# Patient Record
Sex: MALE | Race: WHITE | Employment: UNEMPLOYED | URBAN - METROPOLITAN AREA
[De-identification: names, ages, dates, MRNs, and addresses within clinical notes are randomized per-mention and may not be internally consistent; named-entity substitution may affect disease eponyms.]

---

## 2020-07-21 ENCOUNTER — ED HISTORICAL/CONVERTED ENCOUNTER (OUTPATIENT)
Dept: OTHER | Age: 60
End: 2020-07-21

## 2021-01-10 ENCOUNTER — HOSPITAL ENCOUNTER (EMERGENCY)
Age: 61
Discharge: HOME | End: 2021-01-10
Attending: EMERGENCY MEDICINE | Admitting: EMERGENCY MEDICINE
Payer: MEDICAID

## 2021-01-10 VITALS
SYSTOLIC BLOOD PRESSURE: 127 MMHG | BODY MASS INDEX: 31.07 KG/M2 | DIASTOLIC BLOOD PRESSURE: 88 MMHG | TEMPERATURE: 98.2 F | HEIGHT: 68 IN | RESPIRATION RATE: 17 BRPM | WEIGHT: 205 LBS | HEART RATE: 89 BPM | OXYGEN SATURATION: 95 %

## 2021-01-10 DIAGNOSIS — T33.90XA SUPERFICIAL FROSTBITE, INITIAL ENCOUNTER: Primary | ICD-10-CM

## 2021-01-10 PROCEDURE — 99284 EMERGENCY DEPT VISIT MOD MDM: CPT

## 2021-01-10 NOTE — ED NOTES
Attempting to call pts brenda Mayen Logbryan at [phone], however voicemail is full and not  Able to accept messages, will attempt to find another ride for pt

## 2021-01-10 NOTE — DISCHARGE INSTRUCTIONS
Take tylenol 650mg every 6 hours for pain. Stay in warm environment. Call 911 if you are exposed to cold again.

## 2021-01-10 NOTE — ED TRIAGE NOTES
Per EMS, pts car broke down at 0130am and pt sat in his car since then, pt complains of cold hands and feet    Pt lives with his daughter however didn't want to wake her up

## 2021-01-10 NOTE — ED PROVIDER NOTES
HPI   Chief Complaint   Patient presents with    Physical   57yoM hx spinal cord injury presents for cold exposure. Pt reports his truck broke down at 0130am and pt sat in his truck since then, pt complains of cold painful right hand and feet. Pt lives with his daughter however didn't want to wake her up to pick him up. Past Medical History:   Diagnosis Date    Neurologic abnormality     spinal cord injury, can still walk       History reviewed. No pertinent surgical history. History reviewed. No pertinent family history. Social History     Socioeconomic History    Marital status: SINGLE     Spouse name: Not on file    Number of children: Not on file    Years of education: Not on file    Highest education level: Not on file   Occupational History    Not on file   Social Needs    Financial resource strain: Not on file    Food insecurity     Worry: Not on file     Inability: Not on file    Transportation needs     Medical: Not on file     Non-medical: Not on file   Tobacco Use    Smoking status: Not on file   Substance and Sexual Activity    Alcohol use: Not on file    Drug use: Never    Sexual activity: Not on file   Lifestyle    Physical activity     Days per week: Not on file     Minutes per session: Not on file    Stress: Not on file   Relationships    Social connections     Talks on phone: Not on file     Gets together: Not on file     Attends Mu-ism service: Not on file     Active member of club or organization: Not on file     Attends meetings of clubs or organizations: Not on file     Relationship status: Not on file    Intimate partner violence     Fear of current or ex partner: Not on file     Emotionally abused: Not on file     Physically abused: Not on file     Forced sexual activity: Not on file   Other Topics Concern    Not on file   Social History Narrative    Not on file         ALLERGIES: Patient has no known allergies.     Review of Systems   Constitutional: Cold   Musculoskeletal:        Cold painful right hand and both feet   All other systems reviewed and are negative. Vitals:    01/10/21 0710   BP: 129/82   Pulse: 91   Resp: 16   Temp: 97.9 °F (36.6 °C)   SpO2: 96%   Weight: 93 kg (205 lb)   Height: 5' 8\" (1.727 m)            Physical Exam   Patient Vitals for the past 8 hrs:   Temp Pulse Resp BP SpO2   01/10/21 0710 97.9 °F (36.6 °C) 91 16 129/82 96 %        Nursing note and vitals reviewed. Constitutional: NAD. Head: Normocephalic and atraumatic. Mouth/Throat: Airway patent. Moist mucous membranes. Eyes: EOMI. No Scleral icterus. Neck: Neck supple. Cardiovascular: Well perfused throughout. Pulmonary/Chest: No respiratory distress. Musculoskeletal: No gross deformities. Mild cyanosis to all right fingertips and all toe tips b/l, SILT to all these areas, no blistering; cap refill to all these areas 3sec. Neurological: Alert and oriented to person, place, and time. Cranial Nerves 2-12 intact. Moving all extremities. No gross deficits  Psych: Pleasant, cooperative. Skin: Skin is warm and dry. No rash noted. MDM   Suspect superficial frostbite. I discussed staying warm with the patient as the main treatment. He is given warm blankets and warm space in ED. Called daughter to pick him up to home. Given instructions to f/u PCP.         Procedures

## 2021-06-15 ENCOUNTER — HOSPITAL ENCOUNTER (INPATIENT)
Age: 61
LOS: 2 days | Discharge: HOME | DRG: 190 | End: 2021-06-17
Attending: EMERGENCY MEDICINE | Admitting: FAMILY MEDICINE
Payer: MEDICARE

## 2021-06-15 ENCOUNTER — APPOINTMENT (OUTPATIENT)
Dept: CT IMAGING | Age: 61
DRG: 190 | End: 2021-06-15
Attending: EMERGENCY MEDICINE
Payer: MEDICARE

## 2021-06-15 DIAGNOSIS — R09.02 HYPOXIA: Primary | ICD-10-CM

## 2021-06-15 DIAGNOSIS — N20.0 RENAL CALCULUS OR STONE: ICD-10-CM

## 2021-06-15 DIAGNOSIS — I71.43 ANEURYSM OF INFRARENAL ABDOMINAL AORTA: ICD-10-CM

## 2021-06-15 PROBLEM — J44.9 COPD (CHRONIC OBSTRUCTIVE PULMONARY DISEASE) (HCC): Status: ACTIVE | Noted: 2021-06-15

## 2021-06-15 LAB
ALBUMIN SERPL-MCNC: 3.7 G/DL (ref 3.5–5)
ALBUMIN/GLOB SERPL: 0.8 {RATIO} (ref 1.1–2.2)
ALP SERPL-CCNC: 100 U/L (ref 45–117)
ALT SERPL-CCNC: 58 U/L (ref 12–78)
ANION GAP SERPL CALC-SCNC: 8 MMOL/L (ref 5–15)
APPEARANCE UR: CLEAR
AST SERPL W P-5'-P-CCNC: 28 U/L (ref 15–37)
BACTERIA URNS QL MICRO: NEGATIVE /HPF
BASOPHILS # BLD: 0 K/UL (ref 0–0.1)
BASOPHILS NFR BLD: 0 % (ref 0–1)
BILIRUB SERPL-MCNC: 0.4 MG/DL (ref 0.2–1)
BILIRUB UR QL: NEGATIVE
BUN SERPL-MCNC: 10 MG/DL (ref 6–20)
BUN/CREAT SERPL: 12 (ref 12–20)
CA-I BLD-MCNC: 8.9 MG/DL (ref 8.5–10.1)
CHLORIDE SERPL-SCNC: 102 MMOL/L (ref 97–108)
CO2 SERPL-SCNC: 25 MMOL/L (ref 21–32)
COLOR UR: ABNORMAL
CREAT SERPL-MCNC: 0.82 MG/DL (ref 0.7–1.3)
DIFFERENTIAL METHOD BLD: ABNORMAL
EOSINOPHIL # BLD: 0.6 K/UL (ref 0–0.4)
EOSINOPHIL NFR BLD: 4 % (ref 0–7)
ERYTHROCYTE [DISTWIDTH] IN BLOOD BY AUTOMATED COUNT: 14.9 % (ref 11.5–14.5)
GLOBULIN SER CALC-MCNC: 4.8 G/DL (ref 2–4)
GLUCOSE BLD STRIP.AUTO-MCNC: 122 MG/DL (ref 65–117)
GLUCOSE SERPL-MCNC: 131 MG/DL (ref 65–100)
GLUCOSE UR STRIP.AUTO-MCNC: NEGATIVE MG/DL
HCT VFR BLD AUTO: 46.9 % (ref 36.6–50.3)
HGB BLD-MCNC: 15.6 G/DL (ref 12.1–17)
HGB UR QL STRIP: NEGATIVE
IMM GRANULOCYTES # BLD AUTO: 0 K/UL
IMM GRANULOCYTES NFR BLD AUTO: 0 %
KETONES UR QL STRIP.AUTO: NEGATIVE MG/DL
LEUKOCYTE ESTERASE UR QL STRIP.AUTO: NEGATIVE
LIPASE SERPL-CCNC: 41 U/L (ref 73–393)
LYMPHOCYTES # BLD: 4.5 K/UL (ref 0.8–3.5)
LYMPHOCYTES NFR BLD: 32 % (ref 12–49)
MCH RBC QN AUTO: 30.9 PG (ref 26–34)
MCHC RBC AUTO-ENTMCNC: 33.3 G/DL (ref 30–36.5)
MCV RBC AUTO: 92.9 FL (ref 80–99)
MONOCYTES # BLD: 2 K/UL (ref 0–1)
MONOCYTES NFR BLD: 14 % (ref 5–13)
NEUTS SEG # BLD: 6.6 K/UL (ref 1.8–8)
NEUTS SEG NFR BLD: 47 % (ref 32–75)
NITRITE UR QL STRIP.AUTO: NEGATIVE
NRBC # BLD: 0 K/UL (ref 0–0.01)
NRBC BLD-RTO: 0 PER 100 WBC
OTHER CELLS NFR BLD MANUAL: 3 %
PERFORMED BY, TECHID: ABNORMAL
PH UR STRIP: 5 [PH] (ref 5–8)
PLATELET # BLD AUTO: 373 K/UL (ref 150–400)
PMV BLD AUTO: 11.1 FL (ref 8.9–12.9)
POTASSIUM SERPL-SCNC: 3.9 MMOL/L (ref 3.5–5.1)
PROT SERPL-MCNC: 8.5 G/DL (ref 6.4–8.2)
PROT UR STRIP-MCNC: NEGATIVE MG/DL
RBC # BLD AUTO: 5.05 M/UL (ref 4.1–5.7)
RBC #/AREA URNS HPF: ABNORMAL /HPF (ref 0–5)
RBC MORPH BLD: ABNORMAL
SODIUM SERPL-SCNC: 135 MMOL/L (ref 136–145)
SP GR UR REFRACTOMETRY: >1.03 (ref 1–1.03)
TROPONIN I SERPL-MCNC: <0.05 NG/ML
UA: UC IF INDICATED,UAUC: ABNORMAL
UROBILINOGEN UR QL STRIP.AUTO: 0.1 EU/DL (ref 0.1–1)
WBC # BLD AUTO: 14 K/UL (ref 4.1–11.1)
WBC URNS QL MICRO: ABNORMAL /HPF (ref 0–4)

## 2021-06-15 PROCEDURE — 85025 COMPLETE CBC W/AUTO DIFF WBC: CPT

## 2021-06-15 PROCEDURE — 74011250636 HC RX REV CODE- 250/636: Performed by: FAMILY MEDICINE

## 2021-06-15 PROCEDURE — 74011000258 HC RX REV CODE- 258: Performed by: FAMILY MEDICINE

## 2021-06-15 PROCEDURE — 74011250637 HC RX REV CODE- 250/637: Performed by: FAMILY MEDICINE

## 2021-06-15 PROCEDURE — 87086 URINE CULTURE/COLONY COUNT: CPT

## 2021-06-15 PROCEDURE — 81001 URINALYSIS AUTO W/SCOPE: CPT

## 2021-06-15 PROCEDURE — 65270000029 HC RM PRIVATE

## 2021-06-15 PROCEDURE — 74011000636 HC RX REV CODE- 636: Performed by: EMERGENCY MEDICINE

## 2021-06-15 PROCEDURE — 99285 EMERGENCY DEPT VISIT HI MDM: CPT

## 2021-06-15 PROCEDURE — 96374 THER/PROPH/DIAG INJ IV PUSH: CPT

## 2021-06-15 PROCEDURE — 84484 ASSAY OF TROPONIN QUANT: CPT

## 2021-06-15 PROCEDURE — 94640 AIRWAY INHALATION TREATMENT: CPT

## 2021-06-15 PROCEDURE — 94761 N-INVAS EAR/PLS OXIMETRY MLT: CPT

## 2021-06-15 PROCEDURE — 74011250636 HC RX REV CODE- 250/636: Performed by: EMERGENCY MEDICINE

## 2021-06-15 PROCEDURE — 80053 COMPREHEN METABOLIC PANEL: CPT

## 2021-06-15 PROCEDURE — 74011000250 HC RX REV CODE- 250: Performed by: FAMILY MEDICINE

## 2021-06-15 PROCEDURE — 82962 GLUCOSE BLOOD TEST: CPT

## 2021-06-15 PROCEDURE — 93005 ELECTROCARDIOGRAM TRACING: CPT

## 2021-06-15 PROCEDURE — 83036 HEMOGLOBIN GLYCOSYLATED A1C: CPT

## 2021-06-15 PROCEDURE — 83690 ASSAY OF LIPASE: CPT

## 2021-06-15 PROCEDURE — 36415 COLL VENOUS BLD VENIPUNCTURE: CPT

## 2021-06-15 PROCEDURE — 71275 CT ANGIOGRAPHY CHEST: CPT

## 2021-06-15 PROCEDURE — 74177 CT ABD & PELVIS W/CONTRAST: CPT

## 2021-06-15 RX ORDER — MORPHINE SULFATE 4 MG/ML
4 INJECTION INTRAVENOUS ONCE
Status: COMPLETED | OUTPATIENT
Start: 2021-06-15 | End: 2021-06-15

## 2021-06-15 RX ORDER — BUDESONIDE AND FORMOTEROL FUMARATE DIHYDRATE 160; 4.5 UG/1; UG/1
2 AEROSOL RESPIRATORY (INHALATION) 2 TIMES DAILY
Status: DISCONTINUED | OUTPATIENT
Start: 2021-06-15 | End: 2021-06-17 | Stop reason: HOSPADM

## 2021-06-15 RX ORDER — ACETAMINOPHEN 325 MG/1
650 TABLET ORAL
Status: DISCONTINUED | OUTPATIENT
Start: 2021-06-15 | End: 2021-06-17 | Stop reason: HOSPADM

## 2021-06-15 RX ORDER — DEXTROSE 50 % IN WATER (D50W) INTRAVENOUS SYRINGE
25-50 AS NEEDED
Status: DISCONTINUED | OUTPATIENT
Start: 2021-06-15 | End: 2021-06-17 | Stop reason: HOSPADM

## 2021-06-15 RX ORDER — ACETAMINOPHEN 650 MG/1
650 SUPPOSITORY RECTAL
Status: DISCONTINUED | OUTPATIENT
Start: 2021-06-15 | End: 2021-06-17 | Stop reason: HOSPADM

## 2021-06-15 RX ORDER — ENOXAPARIN SODIUM 100 MG/ML
40 INJECTION SUBCUTANEOUS DAILY
Status: DISCONTINUED | OUTPATIENT
Start: 2021-06-16 | End: 2021-06-17 | Stop reason: HOSPADM

## 2021-06-15 RX ORDER — IPRATROPIUM BROMIDE AND ALBUTEROL SULFATE 2.5; .5 MG/3ML; MG/3ML
3 SOLUTION RESPIRATORY (INHALATION)
Status: DISCONTINUED | OUTPATIENT
Start: 2021-06-15 | End: 2021-06-17 | Stop reason: HOSPADM

## 2021-06-15 RX ORDER — ONDANSETRON 4 MG/1
4 TABLET, ORALLY DISINTEGRATING ORAL
Status: DISCONTINUED | OUTPATIENT
Start: 2021-06-15 | End: 2021-06-17 | Stop reason: HOSPADM

## 2021-06-15 RX ORDER — INSULIN LISPRO 100 [IU]/ML
INJECTION, SOLUTION INTRAVENOUS; SUBCUTANEOUS
Status: DISCONTINUED | OUTPATIENT
Start: 2021-06-15 | End: 2021-06-17 | Stop reason: HOSPADM

## 2021-06-15 RX ORDER — ONDANSETRON 2 MG/ML
4 INJECTION INTRAMUSCULAR; INTRAVENOUS
Status: DISCONTINUED | OUTPATIENT
Start: 2021-06-15 | End: 2021-06-17 | Stop reason: HOSPADM

## 2021-06-15 RX ORDER — POLYETHYLENE GLYCOL 3350 17 G/17G
17 POWDER, FOR SOLUTION ORAL DAILY PRN
Status: DISCONTINUED | OUTPATIENT
Start: 2021-06-15 | End: 2021-06-17 | Stop reason: HOSPADM

## 2021-06-15 RX ORDER — MAGNESIUM SULFATE 100 %
4 CRYSTALS MISCELLANEOUS AS NEEDED
Status: DISCONTINUED | OUTPATIENT
Start: 2021-06-15 | End: 2021-06-17 | Stop reason: HOSPADM

## 2021-06-15 RX ADMIN — PIPERACILLIN AND TAZOBACTAM 3.38 G: 3; .375 INJECTION, POWDER, LYOPHILIZED, FOR SOLUTION INTRAVENOUS at 21:12

## 2021-06-15 RX ADMIN — METHYLPREDNISOLONE SODIUM SUCCINATE 40 MG: 40 INJECTION, POWDER, FOR SOLUTION INTRAMUSCULAR; INTRAVENOUS at 21:11

## 2021-06-15 RX ADMIN — IOPAMIDOL 100 ML: 755 INJECTION, SOLUTION INTRAVENOUS at 17:57

## 2021-06-15 RX ADMIN — MORPHINE SULFATE 4 MG: 4 INJECTION INTRAVENOUS at 16:55

## 2021-06-15 RX ADMIN — BUDESONIDE AND FORMOTEROL FUMARATE DIHYDRATE 2 PUFF: 160; 4.5 AEROSOL RESPIRATORY (INHALATION) at 20:39

## 2021-06-15 RX ADMIN — ACETAMINOPHEN 650 MG: 325 TABLET ORAL at 21:12

## 2021-06-15 RX ADMIN — IPRATROPIUM BROMIDE AND ALBUTEROL SULFATE 3 ML: .5; 3 SOLUTION RESPIRATORY (INHALATION) at 20:39

## 2021-06-15 NOTE — ED TRIAGE NOTES
Pt reports flank pain that started about one year ago, worsened over the month and today the pain was unbearable. Denies hematuria and dysuria. Denies any sob, cp.  Pt on 4L at 96% no o2 at home

## 2021-06-15 NOTE — ED PROVIDER NOTES
HPI   Chief Complaint   Patient presents with    Flank Pain     60yoM hx neurologic abnormality (chronic L1 spinal cord injury, incomplete) presents with flank pain. Pt reports 1 year of constant worsening right flank pain that starts at RUQ and wraps around his back, sharp. Became severe this past week, worse with walking. No fevers, dysuria, n/v/d/constipation, food does not affect the pain. Denies IVDU. Pt says he chronically has groin numbness and mild urinary retention (cannot feel full bladder). Denies incontinence. He was supposed to get worked up years ago but was \"afraid\" of the urologic procedure and did not show. Pt denies SOB. Says has chronic smokers cough but not worse than normal.     Past Medical History:   Diagnosis Date    Neurologic abnormality     spinal cord injury, can still walk       History reviewed. No pertinent surgical history. History reviewed. No pertinent family history. Social History     Socioeconomic History    Marital status: SINGLE     Spouse name: Not on file    Number of children: Not on file    Years of education: Not on file    Highest education level: Not on file   Occupational History    Not on file   Tobacco Use    Smoking status: Current Every Day Smoker   Substance and Sexual Activity    Alcohol use: Not on file    Drug use: Never    Sexual activity: Not on file   Other Topics Concern    Not on file   Social History Narrative    Not on file     Social Determinants of Health     Financial Resource Strain:     Difficulty of Paying Living Expenses:    Food Insecurity:     Worried About Running Out of Food in the Last Year:     [address] in the Last Year:    Transportation Needs:     Lack of Transportation (Medical):      Lack of Transportation (Non-Medical):    Physical Activity:     Days of Exercise per Week:     Minutes of Exercise per Session:    Stress:     Feeling of Stress :    Social Connections:     Frequency of Communication with Friends and Family:     Frequency of Social Gatherings with Friends and Family:     Attends Jainism Services:     Active Member of Clubs or Organizations:     Attends Club or Organization Meetings:     Marital Status:    Intimate Partner Violence:     Fear of Current or Ex-Partner:     Emotionally Abused:     Physically Abused:     Sexually Abused: ALLERGIES: Patient has no known allergies. Review of Systems   Genitourinary: Positive for difficulty urinating (chronic) and flank pain. Neurological: Positive for numbness (chronic groin numb). All other systems reviewed and are negative. Vitals:    06/15/21 1544 06/15/21 1600 06/15/21 1639   BP: (!) 155/105 (!) 148/103 (!) 160/98   Pulse: 91 91 90   Resp: 20 20 17   Temp: 97.9 °F (36.6 °C)     SpO2: (!) 80% 96% 96%   Weight: 90.7 kg (200 lb)     Height: 5' 9\" (1.753 m)              Physical Exam   Patient Vitals for the past 8 hrs:   Temp Pulse Resp BP SpO2   06/15/21 1639 -- 90 17 (!) 160/98 96 %   06/15/21 1600 -- 91 20 (!) 148/103 96 %   06/15/21 1544 97.9 °F (36.6 °C) 91 20 (!) 155/105 (!) 80 %        Nursing note and vitals reviewed. Constitutional: Mild distress from pain. Head: Normocephalic and atraumatic. Mouth/Throat: Airway patent. Moist mucous membranes. Eyes: EOMI. No scleral icterus. Neck: Neck supple. Cardiovascular: Normal rate, regular rhythm. Normal heart sounds. No murmur, rub, or gallop. Good pulses throughout. Pulmonary/Chest: No respiratory distress. Mildly diminished breath sounds throughout. Minimal wheezing. Abdominal/GI: BS normal, Soft, mild RUQ tenderness without Whyte's sign, mild right CVA tenderness to percussion, non-distended. No rebound or guarding. Musculoskeletal: No gross injuries or deformities. No C/T/L tenderness or stepoff. Neurological: Alert and oriented to person, place, and time. Cranial Nerves 2-12 intact. Moving all extremities with 5/5 strength.    Psych: Pleasant, cooperative. Skin: Skin is warm and dry. No rash noted. MDM   Ddx = PE, undiagnosed COPD, pneumonia, ACS, AAA, renal stone, pyelonephritis, gallstone disease, epidural abscess is on the differential but less likely without fever or spinal tenderness. EKG preliminary interpretation by me at 1547 showing normal sinus rhythm, rate 99, no STEMI. There are T wave inversions in anterolateral leads. No large PE on CTA chest.  On CT a/p does have AAA but very small at 2.4cm infrarenal. No obstructing renal stone. I consulted Dr. Des Cannon who is admitting to his service. ICD-10-CM ICD-9-CM    1. Hypoxia  R09.02 799.02    2. Aneurysm of infrarenal abdominal aorta (HCC)  I71.4 441.4    3. Renal calculus or stone  N20.0 592.0        Labs Reviewed   CBC WITH AUTOMATED DIFF - Abnormal; Notable for the following components:       Result Value    WBC 14.0 (*)     RDW 14.9 (*)     MONOCYTES 14 (*)     ABS. LYMPHOCYTES 4.5 (*)     ABS. MONOCYTES 2.0 (*)     ABS. EOSINOPHILS 0.6 (*)     All other components within normal limits   METABOLIC PANEL, COMPREHENSIVE - Abnormal; Notable for the following components:    Sodium 135 (*)     Glucose 131 (*)     Protein, total 8.5 (*)     Globulin 4.8 (*)     A-G Ratio 0.8 (*)     All other components within normal limits   LIPASE - Abnormal; Notable for the following components:    Lipase 41 (*)     All other components within normal limits   URINALYSIS W/ REFLEX CULTURE - Abnormal; Notable for the following components:    Specific gravity >1.030 (*)     All other components within normal limits   TROPONIN I     CTA CHEST W OR W WO CONT   Final Result   1. Study degraded by suboptimal bolus timing and respiratory motion artifact. Allowing for these limitations, no large central pulmonary edema was not. 2. Small pericardial effusion. 3. Nonspecific lymphadenopathy in the chest. Consider follow-up imaging to   evaluate for resolution.    4. There is mosaic attenuation in the lungs. There is basilar bronchial   thickening. Findings are favored to be related to small airways disease. 5. Findings of old granulomatous disease. CT ABD PELV W CONT   Final Result   1. Mild bladder wall thickening with pericystic fat stranding. Recommend   correlation with urinalysis for any evidence of cystitis. 2. Small pericardial effusion. 3. Hepatic steatosis. 4. Nonobstructing right renal stones. No ureteral or bladder stone. No   hydronephrosis. There are areas of cortical scarring in the right kidney as has   been seen on prior study. 5. Infrarenal abdominal aortic aneurysm, measuring 2.4 cm in AP dimension. 6. Normal appendix. 7. Please see above report for further details. Medications   morphine injection 4 mg (4 mg IntraVENous Given 6/15/21 1264)   iopamidoL (ISOVUE-370) 76 % injection 100 mL (100 mL IntraVENous Given 6/15/21 3945)     Sonia HIDALGO MD, am  the first and primary ED provider for this patient.           Procedures

## 2021-06-15 NOTE — Clinical Note
Status[de-identified] INPATIENT [101]   Type of Bed: Medical [8]   Cardiac Monitoring Required?: Yes   Inpatient Hospitalization Certified Necessary for the Following Reasons: 3.  Patient receiving treatment that can only be provided in an inpatient setting (further clarification in H&P documentation)   Admitting Diagnosis: Hypoxia [849297]   Admitting Physician: Alma Shields [[id number]]   Attending Physician: Alma Shields [[id number]]   Estimated Length of Stay: 3-4 Midnights   Discharge Plan[de-identified] Home with Office Follow-up

## 2021-06-16 ENCOUNTER — APPOINTMENT (OUTPATIENT)
Dept: GENERAL RADIOLOGY | Age: 61
DRG: 190 | End: 2021-06-16
Attending: INTERNAL MEDICINE
Payer: MEDICARE

## 2021-06-16 LAB
ALBUMIN SERPL-MCNC: 3.7 G/DL (ref 3.5–5)
ALBUMIN/GLOB SERPL: 0.7 {RATIO} (ref 1.1–2.2)
ALP SERPL-CCNC: 94 U/L (ref 45–117)
ALT SERPL-CCNC: 57 U/L (ref 12–78)
ANION GAP SERPL CALC-SCNC: 13 MMOL/L (ref 5–15)
ARTERIAL PATENCY WRIST A: ABNORMAL
AST SERPL W P-5'-P-CCNC: 25 U/L (ref 15–37)
ATRIAL RATE: 99 BPM
BASE EXCESS BLDA CALC-SCNC: 2.2 MMOL/L (ref 0–2)
BASOPHILS # BLD: 0 K/UL (ref 0–0.1)
BASOPHILS NFR BLD: 0 % (ref 0–1)
BDY SITE: ABNORMAL
BILIRUB SERPL-MCNC: 0.4 MG/DL (ref 0.2–1)
BUN SERPL-MCNC: 16 MG/DL (ref 6–20)
BUN/CREAT SERPL: 13 (ref 12–20)
CA-I BLD-MCNC: 9.4 MG/DL (ref 8.5–10.1)
CALCULATED P AXIS, ECG09: 57 DEGREES
CALCULATED R AXIS, ECG10: 14 DEGREES
CALCULATED T AXIS, ECG11: 50 DEGREES
CHLORIDE SERPL-SCNC: 98 MMOL/L (ref 97–108)
CO2 SERPL-SCNC: 22 MMOL/L (ref 21–32)
CREAT SERPL-MCNC: 1.23 MG/DL (ref 0.7–1.3)
DIAGNOSIS, 93000: NORMAL
DIFFERENTIAL METHOD BLD: ABNORMAL
EOSINOPHIL # BLD: 0 K/UL (ref 0–0.4)
EOSINOPHIL NFR BLD: 0 % (ref 0–7)
ERYTHROCYTE [DISTWIDTH] IN BLOOD BY AUTOMATED COUNT: 15.1 % (ref 11.5–14.5)
GAS FLOW.O2 O2 DELIVERY SYS: 2 L/MIN
GLOBULIN SER CALC-MCNC: 5.1 G/DL (ref 2–4)
GLUCOSE BLD STRIP.AUTO-MCNC: 223 MG/DL (ref 65–117)
GLUCOSE BLD STRIP.AUTO-MCNC: 225 MG/DL (ref 65–117)
GLUCOSE BLD STRIP.AUTO-MCNC: 293 MG/DL (ref 65–117)
GLUCOSE BLD STRIP.AUTO-MCNC: 350 MG/DL (ref 65–117)
GLUCOSE SERPL-MCNC: 294 MG/DL (ref 65–100)
HCO3 BLDA-SCNC: 26 MMOL/L (ref 22–26)
HCT VFR BLD AUTO: 48.7 % (ref 36.6–50.3)
HGB BLD-MCNC: 15.7 G/DL (ref 12.1–17)
IMM GRANULOCYTES # BLD AUTO: 0.1 K/UL (ref 0–0.04)
IMM GRANULOCYTES NFR BLD AUTO: 1 % (ref 0–0.5)
LYMPHOCYTES # BLD: 2.8 K/UL (ref 0.8–3.5)
LYMPHOCYTES NFR BLD: 20 % (ref 12–49)
MCH RBC QN AUTO: 30.5 PG (ref 26–34)
MCHC RBC AUTO-ENTMCNC: 32.2 G/DL (ref 30–36.5)
MCV RBC AUTO: 94.6 FL (ref 80–99)
MONOCYTES # BLD: 1.1 K/UL (ref 0–1)
MONOCYTES NFR BLD: 8 % (ref 5–13)
NEUTS SEG # BLD: 9.7 K/UL (ref 1.8–8)
NEUTS SEG NFR BLD: 71 % (ref 32–75)
NRBC # BLD: 0 K/UL (ref 0–0.01)
NRBC BLD-RTO: 0 PER 100 WBC
P-R INTERVAL, ECG05: 136 MS
PCO2 BLDA: 44 MMHG (ref 35–45)
PERFORMED BY, TECHID: ABNORMAL
PH BLDA: 7.41 [PH] (ref 7.35–7.45)
PLATELET # BLD AUTO: 379 K/UL (ref 150–400)
PMV BLD AUTO: 10.7 FL (ref 8.9–12.9)
PO2 BLDA: 60 MMHG (ref 75–100)
POTASSIUM SERPL-SCNC: 3.8 MMOL/L (ref 3.5–5.1)
PROT SERPL-MCNC: 8.8 G/DL (ref 6.4–8.2)
Q-T INTERVAL, ECG07: 370 MS
QRS DURATION, ECG06: 112 MS
QTC CALCULATION (BEZET), ECG08: 474 MS
RBC # BLD AUTO: 5.15 M/UL (ref 4.1–5.7)
SAO2 % BLD: 93 %
SAO2% DEVICE SAO2% SENSOR NAME: ABNORMAL
SODIUM SERPL-SCNC: 133 MMOL/L (ref 136–145)
SPECIMEN SITE: ABNORMAL
VENTRICULAR RATE, ECG03: 99 BPM
WBC # BLD AUTO: 13.7 K/UL (ref 4.1–11.1)

## 2021-06-16 PROCEDURE — 71045 X-RAY EXAM CHEST 1 VIEW: CPT

## 2021-06-16 PROCEDURE — 74011250637 HC RX REV CODE- 250/637: Performed by: FAMILY MEDICINE

## 2021-06-16 PROCEDURE — 74011636637 HC RX REV CODE- 636/637: Performed by: FAMILY MEDICINE

## 2021-06-16 PROCEDURE — 80053 COMPREHEN METABOLIC PANEL: CPT

## 2021-06-16 PROCEDURE — 94640 AIRWAY INHALATION TREATMENT: CPT

## 2021-06-16 PROCEDURE — 74011000258 HC RX REV CODE- 258: Performed by: FAMILY MEDICINE

## 2021-06-16 PROCEDURE — 77010033678 HC OXYGEN DAILY

## 2021-06-16 PROCEDURE — 74011250637 HC RX REV CODE- 250/637: Performed by: INTERNAL MEDICINE

## 2021-06-16 PROCEDURE — 94760 N-INVAS EAR/PLS OXIMETRY 1: CPT

## 2021-06-16 PROCEDURE — 36600 WITHDRAWAL OF ARTERIAL BLOOD: CPT

## 2021-06-16 PROCEDURE — 85025 COMPLETE CBC W/AUTO DIFF WBC: CPT

## 2021-06-16 PROCEDURE — 82962 GLUCOSE BLOOD TEST: CPT

## 2021-06-16 PROCEDURE — 74011250636 HC RX REV CODE- 250/636: Performed by: FAMILY MEDICINE

## 2021-06-16 PROCEDURE — 65270000029 HC RM PRIVATE

## 2021-06-16 PROCEDURE — 82803 BLOOD GASES ANY COMBINATION: CPT

## 2021-06-16 PROCEDURE — 99222 1ST HOSP IP/OBS MODERATE 55: CPT | Performed by: SURGERY

## 2021-06-16 PROCEDURE — 74011000250 HC RX REV CODE- 250: Performed by: FAMILY MEDICINE

## 2021-06-16 RX ORDER — HYDROXYZINE 25 MG/1
25 TABLET, FILM COATED ORAL
COMMUNITY

## 2021-06-16 RX ORDER — CELECOXIB 200 MG/1
200 CAPSULE ORAL 2 TIMES DAILY
COMMUNITY
End: 2021-06-17

## 2021-06-16 RX ORDER — CYCLOBENZAPRINE HCL 10 MG
10 TABLET ORAL
COMMUNITY
End: 2021-06-17

## 2021-06-16 RX ORDER — NICOTINE 7MG/24HR
1 PATCH, TRANSDERMAL 24 HOURS TRANSDERMAL DAILY
Status: DISCONTINUED | OUTPATIENT
Start: 2021-06-16 | End: 2021-06-17 | Stop reason: HOSPADM

## 2021-06-16 RX ORDER — OXYBUTYNIN CHLORIDE 5 MG/1
5 TABLET ORAL 3 TIMES DAILY
COMMUNITY
End: 2021-06-17

## 2021-06-16 RX ORDER — IBUPROFEN 200 MG
1 TABLET ORAL EVERY 24 HOURS
COMMUNITY

## 2021-06-16 RX ORDER — CETIRIZINE HCL 10 MG
TABLET ORAL DAILY
COMMUNITY
End: 2021-06-17

## 2021-06-16 RX ORDER — SILDENAFIL 100 MG/1
100 TABLET, FILM COATED ORAL AS NEEDED
COMMUNITY
End: 2021-06-17

## 2021-06-16 RX ORDER — VENLAFAXINE HYDROCHLORIDE 150 MG/1
150 TABLET, EXTENDED RELEASE ORAL DAILY
COMMUNITY
End: 2021-06-17

## 2021-06-16 RX ORDER — MELATONIN 5 MG
5 CAPSULE ORAL
COMMUNITY
End: 2021-06-17

## 2021-06-16 RX ORDER — GABAPENTIN 600 MG/1
600 TABLET ORAL 3 TIMES DAILY
COMMUNITY
End: 2021-06-17

## 2021-06-16 RX ORDER — METFORMIN HYDROCHLORIDE 500 MG/1
500 TABLET ORAL 2 TIMES DAILY WITH MEALS
COMMUNITY

## 2021-06-16 RX ORDER — DULOXETIN HYDROCHLORIDE 60 MG/1
60 CAPSULE, DELAYED RELEASE ORAL DAILY
COMMUNITY

## 2021-06-16 RX ORDER — CAPSAICIN 0.03 G/100G
CREAM TOPICAL 3 TIMES DAILY
COMMUNITY
End: 2021-06-17

## 2021-06-16 RX ORDER — ATORVASTATIN CALCIUM 80 MG/1
80 TABLET, FILM COATED ORAL DAILY
COMMUNITY

## 2021-06-16 RX ADMIN — IPRATROPIUM BROMIDE AND ALBUTEROL SULFATE 3 ML: .5; 3 SOLUTION RESPIRATORY (INHALATION) at 13:05

## 2021-06-16 RX ADMIN — ACETAMINOPHEN 650 MG: 325 TABLET ORAL at 09:23

## 2021-06-16 RX ADMIN — IPRATROPIUM BROMIDE AND ALBUTEROL SULFATE 3 ML: .5; 3 SOLUTION RESPIRATORY (INHALATION) at 02:16

## 2021-06-16 RX ADMIN — ENOXAPARIN SODIUM 40 MG: 40 INJECTION SUBCUTANEOUS at 08:45

## 2021-06-16 RX ADMIN — INSULIN LISPRO 6 UNITS: 100 INJECTION, SOLUTION INTRAVENOUS; SUBCUTANEOUS at 08:45

## 2021-06-16 RX ADMIN — BUDESONIDE AND FORMOTEROL FUMARATE DIHYDRATE 2 PUFF: 160; 4.5 AEROSOL RESPIRATORY (INHALATION) at 07:53

## 2021-06-16 RX ADMIN — PIPERACILLIN AND TAZOBACTAM 3.38 G: 3; .375 INJECTION, POWDER, LYOPHILIZED, FOR SOLUTION INTRAVENOUS at 21:05

## 2021-06-16 RX ADMIN — IPRATROPIUM BROMIDE AND ALBUTEROL SULFATE 3 ML: .5; 3 SOLUTION RESPIRATORY (INHALATION) at 20:45

## 2021-06-16 RX ADMIN — PIPERACILLIN AND TAZOBACTAM 3.38 G: 3; .375 INJECTION, POWDER, LYOPHILIZED, FOR SOLUTION INTRAVENOUS at 11:52

## 2021-06-16 RX ADMIN — METHYLPREDNISOLONE SODIUM SUCCINATE 40 MG: 40 INJECTION, POWDER, FOR SOLUTION INTRAMUSCULAR; INTRAVENOUS at 17:32

## 2021-06-16 RX ADMIN — METHYLPREDNISOLONE SODIUM SUCCINATE 40 MG: 40 INJECTION, POWDER, FOR SOLUTION INTRAMUSCULAR; INTRAVENOUS at 04:55

## 2021-06-16 RX ADMIN — INSULIN LISPRO 6 UNITS: 100 INJECTION, SOLUTION INTRAVENOUS; SUBCUTANEOUS at 18:25

## 2021-06-16 RX ADMIN — METHYLPREDNISOLONE SODIUM SUCCINATE 40 MG: 40 INJECTION, POWDER, FOR SOLUTION INTRAMUSCULAR; INTRAVENOUS at 11:53

## 2021-06-16 RX ADMIN — INSULIN LISPRO 10 UNITS: 100 INJECTION, SOLUTION INTRAVENOUS; SUBCUTANEOUS at 11:53

## 2021-06-16 RX ADMIN — INSULIN LISPRO 5 UNITS: 100 INJECTION, SOLUTION INTRAVENOUS; SUBCUTANEOUS at 22:00

## 2021-06-16 RX ADMIN — IPRATROPIUM BROMIDE AND ALBUTEROL SULFATE 3 ML: .5; 3 SOLUTION RESPIRATORY (INHALATION) at 07:53

## 2021-06-16 RX ADMIN — PIPERACILLIN AND TAZOBACTAM 3.38 G: 3; .375 INJECTION, POWDER, LYOPHILIZED, FOR SOLUTION INTRAVENOUS at 04:54

## 2021-06-16 RX ADMIN — BUDESONIDE AND FORMOTEROL FUMARATE DIHYDRATE 2 PUFF: 160; 4.5 AEROSOL RESPIRATORY (INHALATION) at 20:45

## 2021-06-16 NOTE — CONSULTS
History and Physical    Chief complaints: Abdominal pain with AAA. History of Presenting Illness:  Jun Renee is a 62 y.o. pleasant man currently hospitalized with a COPD exacerbation. I was consulted abdominal pain with a setting of AAA. Patient is examined this morning. Patient was comfortable. Patient is currently on 2 L nasal cannula oxygen support. Patient claims that he had this abdominal pain on the right side for a number of years pain radiating to the back side of the right side radiating to the right lower abdomen. Patient denies any nausea or abdominal pain. Patient denies a previous problem with gallbladder issues. Pain is not related to the diet. Patient is not particularly constipated or history of weight loss. Past Medical History:   Diagnosis Date    Neurologic abnormality     spinal cord injury, can still walk      History reviewed. No pertinent surgical history. History reviewed. No pertinent family history. Social History     Tobacco Use    Smoking status: Current Every Day Smoker   Substance Use Topics    Alcohol use: Not on file       Prior to Admission medications    Medication Sig Start Date End Date Taking? Authorizing Provider   metFORMIN (GLUCOPHAGE) 500 mg tablet Take 500 mg by mouth two (2) times daily (with meals). Yes Provider, Historical   cyclobenzaprine (FLEXERIL) 10 mg tablet Take 10 mg by mouth three (3) times daily as needed for Muscle Spasm(s). Yes Provider, Historical   hydrOXYzine HCL (ATARAX) 25 mg tablet Take 25 mg by mouth three (3) times daily as needed. Yes Provider, Historical   Venlafaxine-ER 24 HR (EFFEXOR-ER) 150 mg tr24 tablet Take 150 mg by mouth daily. Yes Provider, Historical   cetirizine (ZYRTEC) 10 mg tablet Take  by mouth daily. Yes Provider, Historical   sildenafil citrate (VIAGRA) 100 mg tablet Take 100 mg by mouth as needed for Erectile Dysfunction.    Yes Provider, Historical   gabapentin (NEURONTIN) 600 mg tablet Take 600 mg by mouth three (3) times daily. Yes Provider, Historical   oxybutynin (DITROPAN) 5 mg tablet Take 5 mg by mouth three (3) times daily. Yes Provider, Historical   DULoxetine (CYMBALTA) 60 mg capsule Take 60 mg by mouth daily. Yes Provider, Historical   melatonin 5 mg cap capsule Take 5 mg by mouth nightly. Yes Provider, Historical   atorvastatin (LIPITOR) 80 mg tablet Take 80 mg by mouth daily. Yes Provider, Historical   celecoxib (CeleBREX) 200 mg capsule Take 200 mg by mouth two (2) times a day. Yes Provider, Historical   capsicum oleoresin 0.025 % topical cream Apply  to affected area three (3) times daily. Yes Provider, Historical   nicotine (NICODERM CQ) 14 mg/24 hr patch 1 Patch by TransDERmal route every twenty-four (24) hours. Yes Provider, Historical     No Known Allergies     Review of Systems:  Pertinent review of systems discussed in HPI, and rest of organ systems personally reviewed and they are negative. Objective:   Vital signs reviewed:      Visit Vitals  BP (!) 152/98   Pulse 94   Temp 98 °F (36.7 °C)   Resp 18   Ht 5' 9\" (1.753 m)   Wt 200 lb (90.7 kg)   SpO2 94%   BMI 29.53 kg/m²       Physical Exam:   General appearance:   Awake and alert  Head and neck atraumatic  Cardiovascular regular rate  Pulmonary no audible wheeze  Eyes pupil equal gaze appropriate  ENT shows oral mucosa dry and there is no jaundice there is no hoarse voice  Abdomen soft obese protuberant no distention no palpable mass. Skin is warm and moist.  Hematology shows no bruising. Neurologically intact nonfocal cranial nerves intact  Musculoskeletal system moving all 4 extremities. Vascular examination shows patient a palpable femoral pulse bilaterally both feet is well-perfused. Data Review: Labs are reviewed.  Discussed  Recent Results (from the past 24 hour(s))   CBC WITH AUTOMATED DIFF    Collection Time: 06/15/21  4:15 PM   Result Value Ref Range    WBC 14.0 (H) 4.1 - 11.1 K/uL RBC 5.05 4. 10 - 5.70 M/uL    HGB 15.6 12.1 - 17.0 g/dL    HCT 46.9 36.6 - 50.3 %    MCV 92.9 80.0 - 99.0 FL    MCH 30.9 26.0 - 34.0 PG    MCHC 33.3 30.0 - 36.5 g/dL    RDW 14.9 (H) 11.5 - 14.5 %    PLATELET 556 550 - 183 K/uL    MPV 11.1 8.9 - 12.9 FL    NRBC 0.0 0.0  WBC    ABSOLUTE NRBC 0.00 0.00 - 0.01 K/uL    NEUTROPHILS 47 32 - 75 %    LYMPHOCYTES 32 12 - 49 %    MONOCYTES 14 (H) 5 - 13 %    EOSINOPHILS 4 0 - 7 %    BASOPHILS 0 0 - 1 %    OTHER CELL 3 %    IMMATURE GRANULOCYTES 0 %    ABS. NEUTROPHILS 6.6 1.8 - 8.0 K/UL    ABS. LYMPHOCYTES 4.5 (H) 0.8 - 3.5 K/UL    ABS. MONOCYTES 2.0 (H) 0.0 - 1.0 K/UL    ABS. EOSINOPHILS 0.6 (H) 0.0 - 0.4 K/UL    ABS. BASOPHILS 0.0 0.0 - 0.1 K/UL    ABS. IMM. GRANS. 0.0 K/UL    DF Manual      RBC COMMENTS Normocytic, Normochromic     METABOLIC PANEL, COMPREHENSIVE    Collection Time: 06/15/21  4:15 PM   Result Value Ref Range    Sodium 135 (L) 136 - 145 mmol/L    Potassium 3.9 3.5 - 5.1 mmol/L    Chloride 102 97 - 108 mmol/L    CO2 25 21 - 32 mmol/L    Anion gap 8 5 - 15 mmol/L    Glucose 131 (H) 65 - 100 mg/dL    BUN 10 6 - 20 mg/dL    Creatinine 0.82 0.70 - 1.30 mg/dL    BUN/Creatinine ratio 12 12 - 20      GFR est AA >60 >60 ml/min/1.73m2    GFR est non-AA >60 >60 ml/min/1.73m2    Calcium 8.9 8.5 - 10.1 mg/dL    Bilirubin, total 0.4 0.2 - 1.0 mg/dL    AST (SGOT) 28 15 - 37 U/L    ALT (SGPT) 58 12 - 78 U/L    Alk.  phosphatase 100 45 - 117 U/L    Protein, total 8.5 (H) 6.4 - 8.2 g/dL    Albumin 3.7 3.5 - 5.0 g/dL    Globulin 4.8 (H) 2.0 - 4.0 g/dL    A-G Ratio 0.8 (L) 1.1 - 2.2     TROPONIN I    Collection Time: 06/15/21  4:15 PM   Result Value Ref Range    Troponin-I, Qt. <0.05 <0.05 ng/mL   LIPASE    Collection Time: 06/15/21  4:15 PM   Result Value Ref Range    Lipase 41 (L) 73 - 393 U/L   URINALYSIS W/ REFLEX CULTURE    Collection Time: 06/15/21  6:33 PM    Specimen: Urine   Result Value Ref Range    Color Yellow/Straw      Appearance Clear Clear      Specific gravity >1.030 (H) 1.003 - 1.030    pH (UA) 5.0 5.0 - 8.0      Protein Negative Negative mg/dL    Glucose Negative Negative mg/dL    Ketone Negative Negative mg/dL    Bilirubin Negative Negative      Blood Negative Negative      Urobilinogen 0.1 0.1 - 1.0 EU/dL    Nitrites Negative Negative      Leukocyte Esterase Negative Negative      UA:UC IF INDICATED Culture not indicated by UA result Culture not indicated by UA result      WBC 0-4 0 - 4 /hpf    RBC 0-5 0 - 5 /hpf    Bacteria Negative Negative /hpf   GLUCOSE, POC    Collection Time: 06/15/21  9:36 PM   Result Value Ref Range    Glucose (POC) 122 (H) 65 - 117 mg/dL    Performed by [address] [place] [address] reviewed: discussed as below. Assessment:     Active Problems:    Hypoxia (6/15/2021)      COPD (chronic obstructive pulmonary disease) ([facility] [address] (6/15/2021)      Abdominal pain. AAA. Renal stone. Plan:     Patient is aneurysm is borderline size of aneurysm per criteria and do not think this is a source of his abdominal pain. Patient's pain is rated right back pain rating to right side abdomen I am not sure if this is related to renal stones. Anyways there is no acute vascular issues at this time to explain patient's abdominal symptoms to my assessment. However I will continue monitor his progress while patient is hospitalized.

## 2021-06-16 NOTE — PROGRESS NOTES
Skin Assessment    Dual skin assessment completed by Ankita Loaiza RN and Scott Branham RN. Patient skin intact with no areas of concern noted.

## 2021-06-16 NOTE — PROGRESS NOTES
Problem: Falls - Risk of  Goal: *Absence of Falls  Description: Document Gaby Martinez Fall Risk and appropriate interventions in the flowsheet.   Outcome: Progressing Towards Goal  Note: Fall Risk Interventions:            Medication Interventions: Evaluate medications/consider consulting pharmacy, Patient to call before getting OOB                   Problem: Patient Education: Go to Patient Education Activity  Goal: Patient/Family Education  Outcome: Progressing Towards Goal     Problem: Pain  Goal: *Control of Pain  Outcome: Progressing Towards Goal  Goal: *PALLIATIVE CARE:  Alleviation of Pain  Outcome: Progressing Towards Goal     Problem: Patient Education: Go to Patient Education Activity  Goal: Patient/Family Education  Outcome: Progressing Towards Goal

## 2021-06-16 NOTE — H&P
History and Physical    NAME: Bala Jones   :  1960   MRN:  [id number]     Date/Time:  6/15/2021 8:27 PM    Patient PCP: Unknown, Provider  ______________________________________________________________________             Subjective:     CHIEF COMPLAINT:     Flank pain    HISTORY OF PRESENT ILLNESS:       Patient is a 62y.o. year old male past medical history of COPD prediabetic history of urinary retention history of spinal cord injury 30 years ago history of chronic L1 spinal cord injury initially came to the hospital with flank pain patient have this flank pain for last 1 year patient normally follow-up at [facility] on last 1 week is getting more worse difficulty in walking due to pain no fever no chills no cough no congestion came to emergency room seen by the ER physician while at the same time patient when came in he was hypoxic oxygen saturation 80% on room air patient has a history of COPD and smokes 1 pack for many years    Seen by the ER physician and recommend the patient to be admitted acute exhibition of COPD work-up done in the ER including the CT scan of the abdomen and pelvis and the CT scan of the chest    IMPRESSION  1. Study degraded by suboptimal bolus timing and respiratory motion artifact. Allowing for these limitations, no large central pulmonary edema was not. 2. Small pericardial effusion. 3. Nonspecific lymphadenopathy in the chest. Consider follow-up imaging to  evaluate for resolution. 4. There is mosaic attenuation in the lungs. There is basilar bronchial  thickening. Findings are favored to be related to small airways disease. 5. Findings of old granulomatous disease. IMPRESSION  1. Mild bladder wall thickening with pericystic fat stranding. Recommend  correlation with urinalysis for any evidence of cystitis. 2. Small pericardial effusion. 3. Hepatic steatosis. 4. Nonobstructing right renal stones. No ureteral or bladder stone. No  hydronephrosis.  There are areas of cortical scarring in the right kidney as has  been seen on prior study. 5. Infrarenal abdominal aortic aneurysm, measuring 2.4 cm in AP dimension. 6. Normal appendix. 7. Please see above report for further details. Past Medical History:   Diagnosis Date    Neurologic abnormality     spinal cord injury, can still walk        History reviewed. No pertinent surgical history. Social History     Tobacco Use    Smoking status: Current Every Day Smoker   Substance Use Topics    Alcohol use: Not on file        History reviewed. No pertinent family history.     No Known Allergies     Prior to Admission medications    Not on File         Current Facility-Administered Medications:     insulin lispro (HUMALOG) injection, , SubCUTAneous, AC&HS, Toma Correa MD    glucose chewable tablet 16 g, 4 Tablet, Oral, PRN, Toma Correa MD    dextrose (D50W) injection syrg 12.5-25 g, 25-50 mL, IntraVENous, PRN, Toma Correa MD    glucagon (GLUCAGEN) injection 1 mg, 1 mg, IntraMUSCular, PRN, Toma Correa MD    acetaminophen (TYLENOL) tablet 650 mg, 650 mg, Oral, Q6H PRN **OR** acetaminophen (TYLENOL) suppository 650 mg, 650 mg, Rectal, Q6H PRN, Toma Correa MD    polyethylene glycol (MIRALAX) packet 17 g, 17 g, Oral, DAILY PRN, Toma Correa MD    ondansetron (ZOFRAN ODT) tablet 4 mg, 4 mg, Oral, Q8H PRN **OR** ondansetron (ZOFRAN) injection 4 mg, 4 mg, IntraVENous, Q6H PRN, Angela Correa MD    [START ON 6/16/2021] enoxaparin (LOVENOX) injection 40 mg, 40 mg, SubCUTAneous, DAILY, Angela Correa MD    albuterol-ipratropium (DUO-NEB) 2.5 MG-0.5 MG/3 ML, 3 mL, Nebulization, Q6H RT, Angela Correa MD    methylPREDNISolone (PF) (SOLU-MEDROL) injection 40 mg, 40 mg, IntraVENous, Q6H, Angela Correa MD    budesonide-formoteroL (SYMBICORT) 160-4.5 mcg/actuation HFA inhaler 2 Puff, 2 Puff, Inhalation, BID, Angela Correa MD    piperacillin-tazobactam (ZOSYN) 3.375 g in 0.9% sodium chloride (MBP/ADV) 100 mL MBP, 3.375 g, IntraVENous, Q8H, Angela Correa MD  No current outpatient medications on file. LAB DATA REVIEWED:    Recent Results (from the past 24 hour(s))   CBC WITH AUTOMATED DIFF    Collection Time: 06/15/21  4:15 PM   Result Value Ref Range    WBC 14.0 (H) 4.1 - 11.1 K/uL    RBC 5.05 4. 10 - 5.70 M/uL    HGB 15.6 12.1 - 17.0 g/dL    HCT 46.9 36.6 - 50.3 %    MCV 92.9 80.0 - 99.0 FL    MCH 30.9 26.0 - 34.0 PG    MCHC 33.3 30.0 - 36.5 g/dL    RDW 14.9 (H) 11.5 - 14.5 %    PLATELET 580 336 - 224 K/uL    MPV 11.1 8.9 - 12.9 FL    NRBC 0.0 0.0  WBC    ABSOLUTE NRBC 0.00 0.00 - 0.01 K/uL    NEUTROPHILS 47 32 - 75 %    LYMPHOCYTES 32 12 - 49 %    MONOCYTES 14 (H) 5 - 13 %    EOSINOPHILS 4 0 - 7 %    BASOPHILS 0 0 - 1 %    OTHER CELL 3 %    IMMATURE GRANULOCYTES 0 %    ABS. NEUTROPHILS 6.6 1.8 - 8.0 K/UL    ABS. LYMPHOCYTES 4.5 (H) 0.8 - 3.5 K/UL    ABS. MONOCYTES 2.0 (H) 0.0 - 1.0 K/UL    ABS. EOSINOPHILS 0.6 (H) 0.0 - 0.4 K/UL    ABS. BASOPHILS 0.0 0.0 - 0.1 K/UL    ABS. IMM. GRANS. 0.0 K/UL    DF Manual      RBC COMMENTS Normocytic, Normochromic     METABOLIC PANEL, COMPREHENSIVE    Collection Time: 06/15/21  4:15 PM   Result Value Ref Range    Sodium 135 (L) 136 - 145 mmol/L    Potassium 3.9 3.5 - 5.1 mmol/L    Chloride 102 97 - 108 mmol/L    CO2 25 21 - 32 mmol/L    Anion gap 8 5 - 15 mmol/L    Glucose 131 (H) 65 - 100 mg/dL    BUN 10 6 - 20 mg/dL    Creatinine 0.82 0.70 - 1.30 mg/dL    BUN/Creatinine ratio 12 12 - 20      GFR est AA >60 >60 ml/min/1.73m2    GFR est non-AA >60 >60 ml/min/1.73m2    Calcium 8.9 8.5 - 10.1 mg/dL    Bilirubin, total 0.4 0.2 - 1.0 mg/dL    AST (SGOT) 28 15 - 37 U/L    ALT (SGPT) 58 12 - 78 U/L    Alk.  phosphatase 100 45 - 117 U/L    Protein, total 8.5 (H) 6.4 - 8.2 g/dL    Albumin 3.7 3.5 - 5.0 g/dL    Globulin 4.8 (H) 2.0 - 4.0 g/dL    A-G Ratio 0.8 (L) 1.1 - 2.2     TROPONIN I    Collection Time: 06/15/21  4:15 PM   Result Value Ref Range Troponin-I, Qt. <0.05 <0.05 ng/mL   LIPASE    Collection Time: 06/15/21  4:15 PM   Result Value Ref Range    Lipase 41 (L) 73 - 393 U/L   URINALYSIS W/ REFLEX CULTURE    Collection Time: 06/15/21  6:33 PM    Specimen: Urine   Result Value Ref Range    Color Yellow/Straw      Appearance Clear Clear      Specific gravity >1.030 (H) 1.003 - 1.030    pH (UA) 5.0 5.0 - 8.0      Protein Negative Negative mg/dL    Glucose Negative Negative mg/dL    Ketone Negative Negative mg/dL    Bilirubin Negative Negative      Blood Negative Negative      Urobilinogen 0.1 0.1 - 1.0 EU/dL    Nitrites Negative Negative      Leukocyte Esterase Negative Negative      UA:UC IF INDICATED Culture not indicated by UA result Culture not indicated by UA result      WBC 0-4 0 - 4 /hpf    RBC 0-5 0 - 5 /hpf    Bacteria Negative Negative /hpf       XR Results (most recent):         CTA CHEST W OR W WO CONT   Final Result   1. Study degraded by suboptimal bolus timing and respiratory motion artifact. Allowing for these limitations, no large central pulmonary edema was not. 2. Small pericardial effusion. 3. Nonspecific lymphadenopathy in the chest. Consider follow-up imaging to   evaluate for resolution. 4. There is mosaic attenuation in the lungs. There is basilar bronchial   thickening. Findings are favored to be related to small airways disease. 5. Findings of old granulomatous disease. CT ABD PELV W CONT   Final Result   1. Mild bladder wall thickening with pericystic fat stranding. Recommend   correlation with urinalysis for any evidence of cystitis. 2. Small pericardial effusion. 3. Hepatic steatosis. 4. Nonobstructing right renal stones. No ureteral or bladder stone. No   hydronephrosis. There are areas of cortical scarring in the right kidney as has   been seen on prior study. 5. Infrarenal abdominal aortic aneurysm, measuring 2.4 cm in AP dimension. 6. Normal appendix.    7. Please see above report for further details. Review of Systems:  Constitutional: Negative for chills and fever. HENT: Negative. Eyes: Negative. Respiratory: Shortness of breath   cardiovascular: Negative. Gastrointestinal: Abdominal pain  Skin: Negative. Neurological: Negative. Objective:   VITALS:    Visit Vitals  BP (!) 160/98   Pulse 90   Temp 97.9 °F (36.6 °C)   Resp 17   Ht 5' 9\" (1.753 m)   Wt 90.7 kg (200 lb)   SpO2 96%   BMI 29.53 kg/m²       Physical Exam:   Constitutional: pt is oriented to person, place, and time. HENT:   Head: Normocephalic and atraumatic. Eyes: Pupils are equal, round, and reactive to light. EOM are normal.   Cardiovascular: Normal rate, regular rhythm and normal heart sounds. Pulmonary/Chest: Breath sounds normal. No wheezes. No rales. Exhibits no tenderness. Abdominal: Soft. Bowel sounds are normal. There is abdominal tenderness. There is no rebound and no guarding. Musculoskeletal: Normal range of motion. Neurological: pt is alert and oriented to person, place, and time. Alert. Normal strength. No cranial nerve deficit or sensory deficit. Displays a negative Romberg sign.         ASSESSMENT & PLAN:    Acute exacerbation of COPD  Abdominal pain right flank pain  History of hypertension  Type 2 diabetes  Ongoing smoking  Chronic back pain from spinal cord injury 30 years ago  Leukocytosis  Mild bladder wall thickening with pericystic fat stranding  Small pericardial effusion  Nonobstructing right renal stone no hydronephrosis  Infrarenal abdominal aortic aneurysm 2.4 cm  Small airway disease      Home medication not available at this time and patient do not remember    DuoNeb nebulizer every 6 hours  Symbicort 160/4.52 puff twice daily  DVT prophylaxis with Lovenox  IV Solu-Medrol 40 IV every 6  IV Zosyn after blood cultures sputum cultures    Pulmonary consult    Surgical consult for abdominal pain    Discussed with the patient regarding home medication list      ________________________________________________________________________    Signed: Jeanette [place], MD

## 2021-06-16 NOTE — CONSULTS
PULMONARY CONSULT  [facility]    Name: Mal Teixeira MRN: [id number]   : 1960 Hospital: [facility]   Date: 2021  Admission date: 6/15/2021 Hospital Day: 2       HPI:     Hospital Problems  Never Reviewed        Codes Class Noted POA    Hypoxia ICD-10-CM: R09.02  ICD-9-CM: 799.02  6/15/2021 Unknown        COP[facility]ch[facility] obstructive pulmonary disease) [facility] ICD-10-CM: J44.9  ICD-9-CM: 839  6/15/2021 Unknown                   [x] High complexity decision making was performed  [x] See my orders for details      Subjective/Initial History:     I was asked by Lisa Arias MD to see Mal Teixeira  a 62 y.o.  male in consultation     Excerpts from admission 6/15/2021 or consult notes as follows:   69-year-old male came in because of shortness of breath dyspnea and having abdominal pain. Past medical history of COPD history of urinary retention after spinal cord injury.   Patient usually follows at [facility].  For the past 1 week he has been having more shortness of breath and dyspnea came to the hospital saturation was about 80% on room air he is a smoker half a pack per day used to smoke 1 pack/day for about 30+ years now is on oxygen 2 L nasal cannula so pulmonary consult was called      No Known Allergies     MAR reviewed and pertinent medications noted or modified as needed     Current Facility-Administered Medications   Medication    insulin lispro (HUMALOG) injection    glucose chewable tablet 16 g    dextrose (D50W) injection syrg 12.5-25 g    glucagon (GLUCAGEN) injection 1 mg    acetaminophen (TYLENOL) tablet 650 mg    Or    acetaminophen (TYLENOL) suppository 650 mg    polyethylene glycol (MIRALAX) packet 17 g    ondansetron (ZOFRAN ODT) tablet 4 mg    Or    ondansetron (ZOFRAN) injection 4 mg    enoxaparin (LOVENOX) injection 40 mg    albuterol-ipratropium (DUO-NEB) 2.5 MG-0.5 MG/3 ML    methylPREDNISolone (PF) (SOLU-MEDROL) injection 40 mg    budesonide-formoteroL (SYMBICORT) 160-4.5 mcg/actuation HFA inhaler 2 Puff    piperacillin-tazobactam (ZOSYN) 3.375 g in 0.9% sodium chloride (MBP/ADV) 100 mL MBP      Patient PCP: Unknown, Provider  PMH:  has a past medical history of Neurologic abnormality. PSH:   has no past surgical history on file. FHX: family history is not on file. SHX:  reports that he has been smoking. He does not have any smokeless tobacco history on file. He reports that he does not use drugs. ROS:    Review of Systems   Constitutional: Positive for malaise/fatigue. HENT: Negative. Eyes: Negative. Respiratory: Positive for cough, shortness of breath and wheezing. Cardiovascular: Negative. Gastrointestinal: Positive for abdominal pain. Genitourinary: Negative. Musculoskeletal: Negative. Skin: Negative. Neurological: Negative. Endo/Heme/Allergies: Negative. Psychiatric/Behavioral: Negative. Objective:     Vital Signs: Telemetry:    normal sinus rhythm Intake/Output:   Visit Vitals  BP (!) 155/88 (BP 1 Location: Right upper arm, BP Patient Position: At rest;Lying;Semi fowlers)   Pulse (!) 101   Temp 97.8 °F (36.6 °C)   Resp 18   Ht 5' 9\" (1.753 m)   Wt 90.7 kg (200 lb)   SpO2 91%   BMI 29.53 kg/m²       Temp (24hrs), Av.9 °F (36.6 °C), Min:97.8 °F (36.6 °C), Max:98 °F (36.7 °C)        O2 Device: Nasal cannula O2 Flow Rate (L/min): 2 l/min       Wt Readings from Last 4 Encounters:   06/15/21 90.7 kg (200 lb)   01/10/21 93 kg (205 lb)        No intake or output data in the 24 hours ending 21 1028    Last shift:      No intake/output data recorded. Last 3 shifts: No intake/output data recorded. Physical Exam:     Physical Exam  Constitutional:       Appearance: Normal appearance. HENT:      Head: Normocephalic and atraumatic. Nose: Nose normal.      Mouth/Throat:      Mouth: Mucous membranes are moist.   Eyes:      Pupils: Pupils are equal, round, and reactive to light. Cardiovascular:      Rate and Rhythm: Normal rate and regular rhythm. Pulses: Normal pulses. Pulmonary:      Effort: Respiratory distress present. Breath sounds: Wheezing present. Abdominal:      General: Abdomen is flat. Tenderness: There is abdominal tenderness. Musculoskeletal:         General: Normal range of motion. Cervical back: Normal range of motion. Skin:     General: Skin is warm. Neurological:      Mental Status: He is alert. Labs:    Recent Labs     06/15/21  1615   WBC 14.0*   HGB 15.6        Recent Labs     06/15/21  1615   *   K 3.9      CO2 25   *   BUN 10   CREA 0.82   CA 8.9   ALB 3.7   ALT 58   LPSE 41*     No results for input(s): PH, PCO2, PO2, HCO3, FIO2 in the last 72 hours. Recent Labs     06/15/21  1615   TROIQ <0.05     No results found for: BNPP, BNP   No results found for: CULTNo results found for: TSH, TSHEXT    Imaging:    CXR Results  (Last 48 hours)    None          Results from Hospital Encounter encounter on 06/15/21    CT ABD PELV W CONT    Narrative  Examination: CT abdomen and pelvis with contrast.    HISTORY: Right flank pain. Technique: Transaxial computed tomographic images of the abdomen and pelvis were  obtained following the uneventful administration of 100 mL Isovue 370  intravenous contrast. Coronal and sagittal reconstructions were created. Dose Reduction: All CT scans at this facility are performed using dose reduction  optimization techniques as appropriate to a performed exam including the  following: Automated exposure control, adjustments of the mA and/or kV according  to patient size, or use of iterative reconstruction technique. COMPARISON: 8/25/2009    FINDINGS: Calcified nodule in the right lung base is compatible with old  granulomatous disease. There is mild basilar atelectasis. There is a small  pericardial effusion.     Hypoattenuation of the liver is compatible with hepatic steatosis. No focal  hepatic lesion. There is no biliary duct dilation. The gallbladder, pancreas and  adrenal glands are normal. There is a small amount of splenic tissue in the left  upper quadrant, but the dominant spleen is absent. There are multiple sub-5 mm  nonobstructing stones in the right kidney. There are areas of cortical scarring  in the right kidney. Left kidney enhances normally. There are no ureteral or  bladder stones. There is no hydronephrosis. The stomach and small and large bowel are normal in course and caliber without  evidence of obstruction or inflammation. The appendix is normal.    There is mild bladder wall thickening with pericystic fat stranding. There is a  small bladder dome diverticulum. The prostate is mildly enlarged. The abdominal  aorta is atherosclerotic and tortuous. There is an infrarenal abdominal aortic  aneurysm, which measures 3.4 cm in AP dimension. There is a mildly enlarged  nonspecific left inguinal lymph node measuring 11 mm in short axis. Additional  scattered subcentimeter lymph nodes are present. There is no free fluid or free  intraperitoneal gas. There is multilevel degenerative disc disease there is unchanged L1 hemangioma. There are changes of diffuse idiopathic skeletal hyperostosis. There is fusion  of facet joints at multiple levels. There is right hip osteoarthritis with a  posterior joint body. Impression  1. Mild bladder wall thickening with pericystic fat stranding. Recommend  correlation with urinalysis for any evidence of cystitis. 2. Small pericardial effusion. 3. Hepatic steatosis. 4. Nonobstructing right renal stones. No ureteral or bladder stone. No  hydronephrosis. There are areas of cortical scarring in the right kidney as has  been seen on prior study. 5. Infrarenal abdominal aortic aneurysm, measuring 2.4 cm in AP dimension. 6. Normal appendix. 7. Please see above report for further details. IMPRESSION:   1.  Acute hypoxic respiratory failure  2. Chronic Obstructive Pulmonary Disease with Severe Acute Exacerbation requiring inpatient hospitalization and management; has very poor airway clearance. Increased work of breathing  3. Body mass index is 29.53 kg/m². 4. Chronic back pain  5. Abdominal aortic aneurysm infrarenal  6. Hypertension and type 2 diabetes mellitus  7. Pt is requiring Drug therapy requiring intensive monitoring for toxicity  8. Pt is unstable, unpredictable needing inpatient monitoring; is acutely ill and at high risk of sudden decline and decompensation with severe consequenses and continued end organ dysfunction and failure  9. Prognosis guarded       RECOMMENDATIONS/PLAN:     1. On nasal Cannula oxygen as salvage oxygen delivery device to provide high concentration of oxygen to overcome refractory hypoxia; will get arterial blood gas  2. Start patient on IV Solu-Medrol nebulizer treatment and inhaled steroid Symbicort  3. Continue to wean oxygen per protocol  4. Nicotine patch  5. Abdominal pain most likely related to renal stone  6. Will get chest x-ray  7. Supplemental O2 to keep sats > 93%  8. Aspiration precautions  9. Labs to follow electrolytes, renal function and and blood counts  10. Glucose monitoring and SSI  11. Bronchial hygiene with respiratory therapy techniques, bronchodilators  12. DVT, SUP prophylaxis  13. Smoking cessation counseling done  14. Pt needs IV fluids with additives and Drug therapy requiring intensive monitoring for toxicity  15.  Prescription drug management with home med reconciliation reviewed       ·           Han Wells MD

## 2021-06-16 NOTE — PROGRESS NOTES
General Daily Progress Note          Patient Name:   Mary Daniels       YOB: 1960       Age:  62 y.o. Admit Date: 6/15/2021      Subjective:     Patient is a 62year old male with a medical history of COPD, prediabetes, history of urinary retention, history of spinal cord injury 30 years ago who presented to the ER with worsening flank pain 6/15. The patient normally follows-up with the [place] for this flank pain that has been present for the past 1 year, but over the last week, it has gotten worse. He is having trouble walking due to the pain. Denies fever, chills, cough, congestion. When he presented to the ER, he was hypoxic with a saturation of 80% on room air. CT scan of abdomen and pelvis performed. Patient admitted due to COPD exacerbation. IMPRESSION  1. Study degraded by suboptimal bolus timing and respiratory motion artifact. Allowing for these limitations, no large central pulmonary edema was not. 2. Small pericardial effusion. 3. Nonspecific lymphadenopathy in the chest. Consider follow-up imaging to  evaluate for resolution. 4. There is mosaic attenuation in the lungs. There is basilar bronchial  thickening. Findings are favored to be related to small airways disease. 5. Findings of old granulomatous disease.     IMPRESSION  1. Mild bladder wall thickening with pericystic fat stranding. Recommend  correlation with urinalysis for any evidence of cystitis. 2. Small pericardial effusion. 3. Hepatic steatosis. 4. Nonobstructing right renal stones. No ureteral or bladder stone. No  hydronephrosis. There are areas of cortical scarring in the right kidney as has  been seen on prior study. 5. Infrarenal abdominal aortic aneurysm, measuring 2.4 cm in AP dimension. 6. Normal appendix. 7. Please see above report for further details    Today, patient is alert, oriented, and in no acute distress. He endorses some SOB, but denies chest pain.        Objective:     Visit Vitals  BP (!) 155/88 (BP 1 Location: Right upper arm, BP Patient Position: At rest;Lying;Semi fowlers)   Pulse (!) 101   Temp 97.8 °F (36.6 °C)   Resp 18   Ht 5' 9\" (1.753 m)   Wt 90.7 kg (200 lb)   SpO2 91%   BMI 29.53 kg/m²        Recent Results (from the past 24 hour(s))   EKG, 12 LEAD, INITIAL    Collection Time: 06/15/21  3:40 PM   Result Value Ref Range    Ventricular Rate 99 BPM    Atrial Rate 99 BPM    P-R Interval 136 ms    QRS Duration 112 ms    Q-T Interval 370 ms    QTC Calculation (Bezet) 474 ms    Calculated P Axis 57 degrees    Calculated R Axis 14 degrees    Calculated T Axis 50 degrees    Diagnosis       Normal sinus rhythm  RSR' or QR pattern in V1 suggests right ventricular conduction delay  Nonspecific ST and T wave abnormality  Prolonged QT  Abnormal ECG  When compared with ECG of 24-SEP-2012 13:02,  ST now depressed in Anterior leads  Confirmed by Chiquis Cota ([id number]) on 6/16/2021 10:22:04 AM     CBC WITH AUTOMATED DIFF    Collection Time: 06/15/21  4:15 PM   Result Value Ref Range    WBC 14.0 (H) 4.1 - 11.1 K/uL    RBC 5.05 4. 10 - 5.70 M/uL    HGB 15.6 12.1 - 17.0 g/dL    HCT 46.9 36.6 - 50.3 %    MCV 92.9 80.0 - 99.0 FL    MCH 30.9 26.0 - 34.0 PG    MCHC 33.3 30.0 - 36.5 g/dL    RDW 14.9 (H) 11.5 - 14.5 %    PLATELET 637 782 - 100 K/uL    MPV 11.1 8.9 - 12.9 FL    NRBC 0.0 0.0  WBC    ABSOLUTE NRBC 0.00 0.00 - 0.01 K/uL    NEUTROPHILS 47 32 - 75 %    LYMPHOCYTES 32 12 - 49 %    MONOCYTES 14 (H) 5 - 13 %    EOSINOPHILS 4 0 - 7 %    BASOPHILS 0 0 - 1 %    OTHER CELL 3 %    IMMATURE GRANULOCYTES 0 %    ABS. NEUTROPHILS 6.6 1.8 - 8.0 K/UL    ABS. LYMPHOCYTES 4.5 (H) 0.8 - 3.5 K/UL    ABS. MONOCYTES 2.0 (H) 0.0 - 1.0 K/UL    ABS. EOSINOPHILS 0.6 (H) 0.0 - 0.4 K/UL    ABS. BASOPHILS 0.0 0.0 - 0.1 K/UL    ABS. IMM.  GRANS. 0.0 K/UL    DF Manual      RBC COMMENTS Normocytic, Normochromic     METABOLIC PANEL, COMPREHENSIVE    Collection Time: 06/15/21  4:15 PM   Result Value Ref Range    Sodium 135 (L) 136 - 145 mmol/L    Potassium 3.9 3.5 - 5.1 mmol/L    Chloride 102 97 - 108 mmol/L    CO2 25 21 - 32 mmol/L    Anion gap 8 5 - 15 mmol/L    Glucose 131 (H) 65 - 100 mg/dL    BUN 10 6 - 20 mg/dL    Creatinine 0.82 0.70 - 1.30 mg/dL    BUN/Creatinine ratio 12 12 - 20      GFR est AA >60 >60 ml/min/1.73m2    GFR est non-AA >60 >60 ml/min/1.73m2    Calcium 8.9 8.5 - 10.1 mg/dL    Bilirubin, total 0.4 0.2 - 1.0 mg/dL    AST (SGOT) 28 15 - 37 U/L    ALT (SGPT) 58 12 - 78 U/L    Alk.  phosphatase 100 45 - 117 U/L    Protein, total 8.5 (H) 6.4 - 8.2 g/dL    Albumin 3.7 3.5 - 5.0 g/dL    Globulin 4.8 (H) 2.0 - 4.0 g/dL    A-G Ratio 0.8 (L) 1.1 - 2.2     TROPONIN I    Collection Time: 06/15/21  4:15 PM   Result Value Ref Range    Troponin-I, Qt. <0.05 <0.05 ng/mL   LIPASE    Collection Time: 06/15/21  4:15 PM   Result Value Ref Range    Lipase 41 (L) 73 - 393 U/L   URINALYSIS W/ REFLEX CULTURE    Collection Time: 06/15/21  6:33 PM    Specimen: Urine   Result Value Ref Range    Color Yellow/Straw      Appearance Clear Clear      Specific gravity >1.030 (H) 1.003 - 1.030    pH (UA) 5.0 5.0 - 8.0      Protein Negative Negative mg/dL    Glucose Negative Negative mg/dL    Ketone Negative Negative mg/dL    Bilirubin Negative Negative      Blood Negative Negative      Urobilinogen 0.1 0.1 - 1.0 EU/dL    Nitrites Negative Negative      Leukocyte Esterase Negative Negative      UA:UC IF INDICATED Culture not indicated by UA result Culture not indicated by UA result      WBC 0-4 0 - 4 /hpf    RBC 0-5 0 - 5 /hpf    Bacteria Negative Negative /hpf   GLUCOSE, POC    Collection Time: 06/15/21  9:36 PM   Result Value Ref Range    Glucose (POC) 122 (H) 65 - 117 mg/dL    Performed by Radha Tong, POC    Collection Time: 06/16/21  7:33 AM   Result Value Ref Range    Glucose (POC) 225 (H) 65 - 117 mg/dL    Performed by ANGELO Card    Collection Time: 06/16/21 10:02 AM   Result Value Ref Range Sodium 133 (L) 136 - 145 mmol/L    Potassium 3.8 3.5 - 5.1 mmol/L    Chloride 98 97 - 108 mmol/L    CO2 22 21 - 32 mmol/L    Anion gap 13 5 - 15 mmol/L    Glucose 294 (H) 65 - 100 mg/dL    BUN 16 6 - 20 mg/dL    Creatinine 1.23 0.70 - 1.30 mg/dL    BUN/Creatinine ratio 13 12 - 20      GFR est AA >60 >60 ml/min/1.73m2    GFR est non-AA >60 >60 ml/min/1.73m2    Calcium 9.4 8.5 - 10.1 mg/dL    Bilirubin, total 0.4 0.2 - 1.0 mg/dL    AST (SGOT) 25 15 - 37 U/L    ALT (SGPT) 57 12 - 78 U/L    Alk. phosphatase 94 45 - 117 U/L    Protein, total 8.8 (H) 6.4 - 8.2 g/dL    Albumin 3.7 3.5 - 5.0 g/dL    Globulin 5.1 (H) 2.0 - 4.0 g/dL    A-G Ratio 0.7 (L) 1.1 - 2.2     CBC WITH AUTOMATED DIFF    Collection Time: 06/16/21 10:02 AM   Result Value Ref Range    WBC 13.7 (H) 4.1 - 11.1 K/uL    RBC 5.15 4.10 - 5.70 M/uL    HGB 15.7 12.1 - 17.0 g/dL    HCT 48.7 36.6 - 50.3 %    MCV 94.6 80.0 - 99.0 FL    MCH 30.5 26.0 - 34.0 PG    MCHC 32.2 30.0 - 36.5 g/dL    RDW 15.1 (H) 11.5 - 14.5 %    PLATELET 859 875 - 689 K/uL    MPV 10.7 8.9 - 12.9 FL    NRBC 0.0 0.0  WBC    ABSOLUTE NRBC 0.00 0.00 - 0.01 K/uL    NEUTROPHILS 71 32 - 75 %    LYMPHOCYTES 20 12 - 49 %    MONOCYTES 8 5 - 13 %    EOSINOPHILS 0 0 - 7 %    BASOPHILS 0 0 - 1 %    IMMATURE GRANULOCYTES 1 (H) 0 - 0.5 %    ABS. NEUTROPHILS 9.7 (H) 1.8 - 8.0 K/UL    ABS. LYMPHOCYTES 2.8 0.8 - 3.5 K/UL    ABS. MONOCYTES 1.1 (H) 0.0 - 1.0 K/UL    ABS. EOSINOPHILS 0.0 0.0 - 0.4 K/UL    ABS. BASOPHILS 0.0 0.0 - 0.1 K/UL    ABS. IMM.  GRANS. 0.1 (H) 0.00 - 0.04 K/UL    DF AUTOMATED     BLOOD GAS, ARTERIAL    Collection Time: 06/16/21 10:45 AM   Result Value Ref Range    pH 7.41 7.35 - 7.45      PCO2 44 35 - 45 mmHg    PO2 60 (L) 75 - 100 mmHg    O2 SAT 93 (L) >95 %    BICARBONATE 26 22 - 26 mmol/L    BASE EXCESS 2.2 (H) 0 - 2 mmol/L    O2 METHOD Nasal Cannula      O2 FLOW RATE 2.0 L/min    Sample source Arterial      SITE Right Brachial      JONO'S TEST PASS     GLUCOSE, POC Collection Time: 06/16/21 11:18 AM   Result Value Ref Range    Glucose (POC) 293 (H) 65 - 117 mg/dL    Performed by Rabia Seth      [unfilled]      Review of Systems    Constitutional: Negative for chills and fever. HENT: Negative. Eyes: Negative. Respiratory: SOB. Cardiovascular: Negative. Gastrointestinal: Negative for abdominal pain and nausea. Skin: Negative. Neurological: Negative. Physical Exam:      Constitutional: pt is oriented to person, place, and time. HENT:   Head: Normocephalic and atraumatic. Eyes: Pupils are equal, round, and reactive to light. EOM are normal.   Cardiovascular: Normal rate, regular rhythm and normal heart sounds. Pulmonary/Chest: Breath sounds normal. No wheezes. No rales. Exhibits no tenderness. Abdominal: Soft. Bowel sounds are normal. There is no abdominal tenderness. There is no rebound and no guarding. Musculoskeletal: Normal range of motion. Neurological: pt is alert and oriented to person, place, and time. CTA CHEST W OR W WO CONT   Final Result   1. Study degraded by suboptimal bolus timing and respiratory motion artifact. Allowing for these limitations, no large central pulmonary edema was not. 2. Small pericardial effusion. 3. Nonspecific lymphadenopathy in the chest. Consider follow-up imaging to   evaluate for resolution. 4. There is mosaic attenuation in the lungs. There is basilar bronchial   thickening. Findings are favored to be related to small airways disease. 5. Findings of old granulomatous disease. CT ABD PELV W CONT   Final Result   1. Mild bladder wall thickening with pericystic fat stranding. Recommend   correlation with urinalysis for any evidence of cystitis. 2. Small pericardial effusion. 3. Hepatic steatosis. 4. Nonobstructing right renal stones. No ureteral or bladder stone. No   hydronephrosis.  There are areas of cortical scarring in the right kidney as has   been seen on prior study.   5. Infrarenal abdominal aortic aneurysm, measuring 2.4 cm in AP dimension. 6. Normal appendix. 7. Please see above report for further details. XR CHEST PORT    (Results Pending)        Recent Results (from the past 24 hour(s))   EKG, 12 LEAD, INITIAL    Collection Time: 06/15/21  3:40 PM   Result Value Ref Range    Ventricular Rate 99 BPM    Atrial Rate 99 BPM    P-R Interval 136 ms    QRS Duration 112 ms    Q-T Interval 370 ms    QTC Calculation (Bezet) 474 ms    Calculated P Axis 57 degrees    Calculated R Axis 14 degrees    Calculated T Axis 50 degrees    Diagnosis       Normal sinus rhythm  RSR' or QR pattern in V1 suggests right ventricular conduction delay  Nonspecific ST and T wave abnormality  Prolonged QT  Abnormal ECG  When compared with ECG of 24-SEP-2012 13:02,  ST now depressed in Anterior leads  Confirmed by Jayson Mcclellan ([id number]) on 6/16/2021 10:22:04 AM     CBC WITH AUTOMATED DIFF    Collection Time: 06/15/21  4:15 PM   Result Value Ref Range    WBC 14.0 (H) 4.1 - 11.1 K/uL    RBC 5.05 4. 10 - 5.70 M/uL    HGB 15.6 12.1 - 17.0 g/dL    HCT 46.9 36.6 - 50.3 %    MCV 92.9 80.0 - 99.0 FL    MCH 30.9 26.0 - 34.0 PG    MCHC 33.3 30.0 - 36.5 g/dL    RDW 14.9 (H) 11.5 - 14.5 %    PLATELET 725 870 - 572 K/uL    MPV 11.1 8.9 - 12.9 FL    NRBC 0.0 0.0  WBC    ABSOLUTE NRBC 0.00 0.00 - 0.01 K/uL    NEUTROPHILS 47 32 - 75 %    LYMPHOCYTES 32 12 - 49 %    MONOCYTES 14 (H) 5 - 13 %    EOSINOPHILS 4 0 - 7 %    BASOPHILS 0 0 - 1 %    OTHER CELL 3 %    IMMATURE GRANULOCYTES 0 %    ABS. NEUTROPHILS 6.6 1.8 - 8.0 K/UL    ABS. LYMPHOCYTES 4.5 (H) 0.8 - 3.5 K/UL    ABS. MONOCYTES 2.0 (H) 0.0 - 1.0 K/UL    ABS. EOSINOPHILS 0.6 (H) 0.0 - 0.4 K/UL    ABS. BASOPHILS 0.0 0.0 - 0.1 K/UL    ABS. IMM.  GRANS. 0.0 K/UL    DF Manual      RBC COMMENTS Normocytic, Normochromic     METABOLIC PANEL, COMPREHENSIVE    Collection Time: 06/15/21  4:15 PM   Result Value Ref Range    Sodium 135 (L) 136 - 145 mmol/L Potassium 3.9 3.5 - 5.1 mmol/L    Chloride 102 97 - 108 mmol/L    CO2 25 21 - 32 mmol/L    Anion gap 8 5 - 15 mmol/L    Glucose 131 (H) 65 - 100 mg/dL    BUN 10 6 - 20 mg/dL    Creatinine 0.82 0.70 - 1.30 mg/dL    BUN/Creatinine ratio 12 12 - 20      GFR est AA >60 >60 ml/min/1.73m2    GFR est non-AA >60 >60 ml/min/1.73m2    Calcium 8.9 8.5 - 10.1 mg/dL    Bilirubin, total 0.4 0.2 - 1.0 mg/dL    AST (SGOT) 28 15 - 37 U/L    ALT (SGPT) 58 12 - 78 U/L    Alk.  phosphatase 100 45 - 117 U/L    Protein, total 8.5 (H) 6.4 - 8.2 g/dL    Albumin 3.7 3.5 - 5.0 g/dL    Globulin 4.8 (H) 2.0 - 4.0 g/dL    A-G Ratio 0.8 (L) 1.1 - 2.2     TROPONIN I    Collection Time: 06/15/21  4:15 PM   Result Value Ref Range    Troponin-I, Qt. <0.05 <0.05 ng/mL   LIPASE    Collection Time: 06/15/21  4:15 PM   Result Value Ref Range    Lipase 41 (L) 73 - 393 U/L   URINALYSIS W/ REFLEX CULTURE    Collection Time: 06/15/21  6:33 PM    Specimen: Urine   Result Value Ref Range    Color Yellow/Straw      Appearance Clear Clear      Specific gravity >1.030 (H) 1.003 - 1.030    pH (UA) 5.0 5.0 - 8.0      Protein Negative Negative mg/dL    Glucose Negative Negative mg/dL    Ketone Negative Negative mg/dL    Bilirubin Negative Negative      Blood Negative Negative      Urobilinogen 0.1 0.1 - 1.0 EU/dL    Nitrites Negative Negative      Leukocyte Esterase Negative Negative      UA:UC IF INDICATED Culture not indicated by UA result Culture not indicated by UA result      WBC 0-4 0 - 4 /hpf    RBC 0-5 0 - 5 /hpf    Bacteria Negative Negative /hpf   GLUCOSE, POC    Collection Time: 06/15/21  9:36 PM   Result Value Ref Range    Glucose (POC) 122 (H) 65 - 117 mg/dL    Performed by Aime Uribe, POC    Collection Time: 06/16/21  7:33 AM   Result Value Ref Range    Glucose (POC) 225 (H) 65 - 117 mg/dL    Performed by ANGELO Card    Collection Time: 06/16/21 10:02 AM   Result Value Ref Range    Sodium 133 (L) 136 - 145 mmol/L    Potassium 3.8 3.5 - 5.1 mmol/L    Chloride 98 97 - 108 mmol/L    CO2 22 21 - 32 mmol/L    Anion gap 13 5 - 15 mmol/L    Glucose 294 (H) 65 - 100 mg/dL    BUN 16 6 - 20 mg/dL    Creatinine 1.23 0.70 - 1.30 mg/dL    BUN/Creatinine ratio 13 12 - 20      GFR est AA >60 >60 ml/min/1.73m2    GFR est non-AA >60 >60 ml/min/1.73m2    Calcium 9.4 8.5 - 10.1 mg/dL    Bilirubin, total 0.4 0.2 - 1.0 mg/dL    AST (SGOT) 25 15 - 37 U/L    ALT (SGPT) 57 12 - 78 U/L    Alk. phosphatase 94 45 - 117 U/L    Protein, total 8.8 (H) 6.4 - 8.2 g/dL    Albumin 3.7 3.5 - 5.0 g/dL    Globulin 5.1 (H) 2.0 - 4.0 g/dL    A-G Ratio 0.7 (L) 1.1 - 2.2     CBC WITH AUTOMATED DIFF    Collection Time: 06/16/21 10:02 AM   Result Value Ref Range    WBC 13.7 (H) 4.1 - 11.1 K/uL    RBC 5.15 4.10 - 5.70 M/uL    HGB 15.7 12.1 - 17.0 g/dL    HCT 48.7 36.6 - 50.3 %    MCV 94.6 80.0 - 99.0 FL    MCH 30.5 26.0 - 34.0 PG    MCHC 32.2 30.0 - 36.5 g/dL    RDW 15.1 (H) 11.5 - 14.5 %    PLATELET 292 953 - 363 K/uL    MPV 10.7 8.9 - 12.9 FL    NRBC 0.0 0.0  WBC    ABSOLUTE NRBC 0.00 0.00 - 0.01 K/uL    NEUTROPHILS 71 32 - 75 %    LYMPHOCYTES 20 12 - 49 %    MONOCYTES 8 5 - 13 %    EOSINOPHILS 0 0 - 7 %    BASOPHILS 0 0 - 1 %    IMMATURE GRANULOCYTES 1 (H) 0 - 0.5 %    ABS. NEUTROPHILS 9.7 (H) 1.8 - 8.0 K/UL    ABS. LYMPHOCYTES 2.8 0.8 - 3.5 K/UL    ABS. MONOCYTES 1.1 (H) 0.0 - 1.0 K/UL    ABS. EOSINOPHILS 0.0 0.0 - 0.4 K/UL    ABS. BASOPHILS 0.0 0.0 - 0.1 K/UL    ABS. IMM.  GRANS. 0.1 (H) 0.00 - 0.04 K/UL    DF AUTOMATED     BLOOD GAS, ARTERIAL    Collection Time: 06/16/21 10:45 AM   Result Value Ref Range    pH 7.41 7.35 - 7.45      PCO2 44 35 - 45 mmHg    PO2 60 (L) 75 - 100 mmHg    O2 SAT 93 (L) >95 %    BICARBONATE 26 22 - 26 mmol/L    BASE EXCESS 2.2 (H) 0 - 2 mmol/L    O2 METHOD Nasal Cannula      O2 FLOW RATE 2.0 L/min    Sample source Arterial      SITE Right Brachial      JONO'S TEST PASS     GLUCOSE, POC    Collection Time: 06/16/21 11:18 AM   Result Value Ref Range    Glucose (POC) 293 (H) 65 - 117 mg/dL    Performed by Panfilo Maloney        Results     Procedure Component Value Units Date/Time    CULTURE, URINE [[id number]] Collected: 06/15/21 2030    Order Status: Completed Specimen: Urine Updated: 06/16/21 0813    CULTURE, RESPIRATORY/SPUTUM/BRONCH Erasto Jordan STAIN [[id number]]     Order Status: Sent Specimen: Sputum     CULTURE, BLOOD, LINE [[id number]]     Order Status: Sent Specimen: Blood            Labs:     Recent Labs     06/16/21  1002 06/15/21  1615   WBC 13.7* 14.0*   HGB 15.7 15.6   HCT 48.7 46.9    373     Recent Labs     06/16/21  1002 06/15/21  1615   * 135*   K 3.8 3.9   CL 98 102   CO2 22 25   BUN 16 10   CREA 1.23 0.82   * 131*   CA 9.4 8.9     Recent Labs     06/16/21  1002 06/15/21  1615   ALT 57 58   AP 94 100   TBILI 0.4 0.4   TP 8.8* 8.5*   ALB 3.7 3.7   GLOB 5.1* 4.8*   LPSE  --  41*     No results for input(s): INR, PTP, APTT, INREXT, INREXT in the last 72 hours. No results for input(s): FE, TIBC, PSAT, FERR in the last 72 hours.    No results found for: FOL, RBCF   Recent Labs     06/16/21  1045   PH 7.41   PCO2 44   PO2 60*     Recent Labs     06/15/21  1615   TROIQ <0.05     No results found for: CHOL, CHOLX, CHLST, CHOLV, HDL, HDLP, LDL, LDLC, DLDLP, TGLX, TRIGL, TRIGP, CHHD, CHHDX  Lab Results   Component Value Date/Time    Glucose (POC) 293 (H) 06/16/2021 11:18 AM    Glucose (POC) 225 (H) 06/16/2021 07:33 AM    Glucose (POC) 122 (H) 06/15/2021 09:36 PM     Lab Results   Component Value Date/Time    Color Yellow/Straw 06/15/2021 06:33 PM    Appearance Clear 06/15/2021 06:33 PM    Specific gravity >1.030 (H) 06/15/2021 06:33 PM    pH (UA) 5.0 06/15/2021 06:33 PM    Protein Negative 06/15/2021 06:33 PM    Glucose Negative 06/15/2021 06:33 PM    Ketone Negative 06/15/2021 06:33 PM    Bilirubin Negative 06/15/2021 06:33 PM    Urobilinogen 0.1 06/15/2021 06:33 PM    Nitrites Negative 06/15/2021 06:33 PM    Leukocyte Esterase Negative 06/15/2021 06:33 PM    Bacteria Negative 06/15/2021 06:33 PM    WBC 0-4 06/15/2021 06:33 PM    RBC 0-5 06/15/2021 06:33 PM         Assessment:     Acute exacerbation of COPD  Abdominal pain right flank pain  History of hypertension  Type 2 diabetes  Ongoing smoking  Chronic back pain from spinal cord injury 30 years ago  Leukocytosis  Mild bladder wall thickening with pericystic fat stranding  Small pericardial effusion  Nonobstructing right renal stone no hydronephrosis  Infrarenal abdominal aortic aneurysm 2.4 cm  Small airway disease      Plan:        DuoNeb nebulizer every 6 hours  Symbicort 160/4.52 puff twice daily  DVT prophylaxis with Lovenox  IV Solu-Medrol 40 IV every 6  IV Zosyn after blood cultures sputum cultures  Nicotine patch      Pulmonary consulted     Surgical consulted for abdominal pain. Due to borderline size of aneurysm, surgeon does not believe that this is the source of abdominal pain.      PT OT consult    Ambulatory pulse ox        Current Facility-Administered Medications:     nicotine (NICODERM CQ) 7 mg/24 hr patch 1 Patch, 1 Patch, TransDERmal, DAILY, Alex Jimenez MD    insulin lispro (HUMALOG) injection, , SubCUTAneous, AC&HS, Angela Correa MD, 10 Units at 06/16/21 1153    glucose chewable tablet 16 g, 4 Tablet, Oral, PRN, Mandy Correa MD    dextrose (D50W) injection syrg 12.5-25 g, 25-50 mL, IntraVENous, PRN, Mandy Correa MD    glucagon (GLUCAGEN) injection 1 mg, 1 mg, IntraMUSCular, PRN, Mandy Correa MD    acetaminophen (TYLENOL) tablet 650 mg, 650 mg, Oral, Q6H PRN, 650 mg at 06/16/21 2230 **OR** acetaminophen (TYLENOL) suppository 650 mg, 650 mg, Rectal, Q6H PRN, Mandy Correa MD    polyethylene glycol (MIRALAX) packet 17 g, 17 g, Oral, DAILY PRN, Angela Correa MD    ondansetron (ZOFRAN ODT) tablet 4 mg, 4 mg, Oral, Q8H PRN **OR** ondansetron (ZOFRAN) injection 4 mg, 4 mg, IntraVENous, Q6H PRN, Tesfaye Trujillo MD    enoxaparin (LOVENOX) injection 40 mg, 40 mg, SubCUTAneous, DAILY, Angela Correa MD, 40 mg at 06/16/21 0845    albuterol-ipratropium (DUO-NEB) 2.5 MG-0.5 MG/3 ML, 3 mL, Nebulization, Q6H RT, Angela Correa MD, 3 mL at 06/16/21 0753    methylPREDNISolone (PF) (SOLU-MEDROL) injection 40 mg, 40 mg, IntraVENous, Q6H, Angela Correa MD, 40 mg at 06/16/21 1153    budesonide-formoteroL (SYMBICORT) 160-4.5 mcg/actuation HFA inhaler 2 Puff, 2 Puff, Inhalation, BID, Angela Correa MD, 2 Puff at 06/16/21 0753    piperacillin-tazobactam (ZOSYN) 3.375 g in 0.9% sodium chloride (MBP/ADV) 100 mL MBP, 3.375 g, IntraVENous, Q8H, Angela Correa MD, Last Rate: 200 mL/hr at 06/16/21 1152, 3.375 g at 06/16/21 1152

## 2021-06-16 NOTE — ROUTINE PROCESS
Bedside and verbal shift change report given to Mik Ang LPN (oncoming nurse) by Lobito Hardy RN (offgoing nurse). Report included the following information SBAR, Kardex, Intake/Output, MAR, Recent Results, and Quality Measures.

## 2021-06-16 NOTE — ED NOTES
TRANSFER - OUT REPORT:    Verbal report given to MALI CAAL(name) on Tracydiandra Cody  being transferred to 4E  (unit) for routine progression of care       Report consisted of patients Situation, Background, Assessment and   Recommendations(SBAR). Information from the following report(s) ED Summary, MAR and Recent Results was reviewed with the receiving nurse. Lines:   Peripheral IV 06/15/21 Anterior;Left;Proximal Forearm (Active)        Opportunity for questions and clarification was provided.       Patient transported with:   [employer]

## 2021-06-16 NOTE — PROGRESS NOTES
General Daily Progress Note          Patient Name:   Ernie Coates       YOB: 1960       Age:  62 y.o. Admit Date: 6/15/2021      Subjective:     Patient is a 62year old male with a medical history of COPD, prediabetes, history of urinary retention, history of spinal cord injury 30 years ago who presented to the ER with worsening flank pain 6/15. The patient normally follows-up with the [place] for this flank pain that has been present for the past 1 year, but over the last week, it has gotten worse. He is having trouble walking due to the pain. Denies fever, chills, cough, congestion. When he presented to the ER, he was hypoxic with a saturation of 80% on room air. CT scan of abdomen and pelvis performed. Patient admitted due to COPD exacerbation. IMPRESSION  1. Study degraded by suboptimal bolus timing and respiratory motion artifact. Allowing for these limitations, no large central pulmonary edema was not. 2. Small pericardial effusion. 3. Nonspecific lymphadenopathy in the chest. Consider follow-up imaging to  evaluate for resolution. 4. There is mosaic attenuation in the lungs. There is basilar bronchial  thickening. Findings are favored to be related to small airways disease. 5. Findings of old granulomatous disease.     IMPRESSION  1. Mild bladder wall thickening with pericystic fat stranding. Recommend  correlation with urinalysis for any evidence of cystitis. 2. Small pericardial effusion. 3. Hepatic steatosis. 4. Nonobstructing right renal stones. No ureteral or bladder stone. No  hydronephrosis. There are areas of cortical scarring in the right kidney as has  been seen on prior study. 5. Infrarenal abdominal aortic aneurysm, measuring 2.4 cm in AP dimension. 6. Normal appendix. 7. Please see above report for further details    Today, patient is alert, oriented, and in no acute distress. He endorses some SOB, but denies chest pain.        Objective:     Visit Vitals  BP (!) 155/88 (BP 1 Location: Right upper arm, BP Patient Position: At rest;Lying;Semi fowlers)   Pulse (!) 101   Temp 97.8 °F (36.6 °C)   Resp 18   Ht 5' 9\" (1.753 m)   Wt 90.7 kg (200 lb)   SpO2 91%   BMI 29.53 kg/m²        Recent Results (from the past 24 hour(s))   EKG, 12 LEAD, INITIAL    Collection Time: 06/15/21  3:40 PM   Result Value Ref Range    Ventricular Rate 99 BPM    Atrial Rate 99 BPM    P-R Interval 136 ms    QRS Duration 112 ms    Q-T Interval 370 ms    QTC Calculation (Bezet) 474 ms    Calculated P Axis 57 degrees    Calculated R Axis 14 degrees    Calculated T Axis 50 degrees    Diagnosis       Normal sinus rhythm  RSR' or QR pattern in V1 suggests right ventricular conduction delay  Nonspecific ST and T wave abnormality  Prolonged QT  Abnormal ECG  When compared with ECG of 24-SEP-2012 13:02,  ST now depressed in Anterior leads  Confirmed by Lashay Jacob ([id number]) on 6/16/2021 10:22:04 AM     CBC WITH AUTOMATED DIFF    Collection Time: 06/15/21  4:15 PM   Result Value Ref Range    WBC 14.0 (H) 4.1 - 11.1 K/uL    RBC 5.05 4. 10 - 5.70 M/uL    HGB 15.6 12.1 - 17.0 g/dL    HCT 46.9 36.6 - 50.3 %    MCV 92.9 80.0 - 99.0 FL    MCH 30.9 26.0 - 34.0 PG    MCHC 33.3 30.0 - 36.5 g/dL    RDW 14.9 (H) 11.5 - 14.5 %    PLATELET 663 053 - 156 K/uL    MPV 11.1 8.9 - 12.9 FL    NRBC 0.0 0.0  WBC    ABSOLUTE NRBC 0.00 0.00 - 0.01 K/uL    NEUTROPHILS 47 32 - 75 %    LYMPHOCYTES 32 12 - 49 %    MONOCYTES 14 (H) 5 - 13 %    EOSINOPHILS 4 0 - 7 %    BASOPHILS 0 0 - 1 %    OTHER CELL 3 %    IMMATURE GRANULOCYTES 0 %    ABS. NEUTROPHILS 6.6 1.8 - 8.0 K/UL    ABS. LYMPHOCYTES 4.5 (H) 0.8 - 3.5 K/UL    ABS. MONOCYTES 2.0 (H) 0.0 - 1.0 K/UL    ABS. EOSINOPHILS 0.6 (H) 0.0 - 0.4 K/UL    ABS. BASOPHILS 0.0 0.0 - 0.1 K/UL    ABS. IMM.  GRANS. 0.0 K/UL    DF Manual      RBC COMMENTS Normocytic, Normochromic     METABOLIC PANEL, COMPREHENSIVE    Collection Time: 06/15/21  4:15 PM   Result Value Ref Range    Sodium 135 (L) 136 - 145 mmol/L    Potassium 3.9 3.5 - 5.1 mmol/L    Chloride 102 97 - 108 mmol/L    CO2 25 21 - 32 mmol/L    Anion gap 8 5 - 15 mmol/L    Glucose 131 (H) 65 - 100 mg/dL    BUN 10 6 - 20 mg/dL    Creatinine 0.82 0.70 - 1.30 mg/dL    BUN/Creatinine ratio 12 12 - 20      GFR est AA >60 >60 ml/min/1.73m2    GFR est non-AA >60 >60 ml/min/1.73m2    Calcium 8.9 8.5 - 10.1 mg/dL    Bilirubin, total 0.4 0.2 - 1.0 mg/dL    AST (SGOT) 28 15 - 37 U/L    ALT (SGPT) 58 12 - 78 U/L    Alk.  phosphatase 100 45 - 117 U/L    Protein, total 8.5 (H) 6.4 - 8.2 g/dL    Albumin 3.7 3.5 - 5.0 g/dL    Globulin 4.8 (H) 2.0 - 4.0 g/dL    A-G Ratio 0.8 (L) 1.1 - 2.2     TROPONIN I    Collection Time: 06/15/21  4:15 PM   Result Value Ref Range    Troponin-I, Qt. <0.05 <0.05 ng/mL   LIPASE    Collection Time: 06/15/21  4:15 PM   Result Value Ref Range    Lipase 41 (L) 73 - 393 U/L   URINALYSIS W/ REFLEX CULTURE    Collection Time: 06/15/21  6:33 PM    Specimen: Urine   Result Value Ref Range    Color Yellow/Straw      Appearance Clear Clear      Specific gravity >1.030 (H) 1.003 - 1.030    pH (UA) 5.0 5.0 - 8.0      Protein Negative Negative mg/dL    Glucose Negative Negative mg/dL    Ketone Negative Negative mg/dL    Bilirubin Negative Negative      Blood Negative Negative      Urobilinogen 0.1 0.1 - 1.0 EU/dL    Nitrites Negative Negative      Leukocyte Esterase Negative Negative      UA:UC IF INDICATED Culture not indicated by UA result Culture not indicated by UA result      WBC 0-4 0 - 4 /hpf    RBC 0-5 0 - 5 /hpf    Bacteria Negative Negative /hpf   GLUCOSE, POC    Collection Time: 06/15/21  9:36 PM   Result Value Ref Range    Glucose (POC) 122 (H) 65 - 117 mg/dL    Performed by Cervalisus Lavender, POC    Collection Time: 06/16/21  7:33 AM   Result Value Ref Range    Glucose (POC) 225 (H) 65 - 117 mg/dL    Performed by Hitesh Angeles    CBC WITH AUTOMATED DIFF    Collection Time: 06/16/21 10:02 AM   Result Value Ref Range    WBC 13.7 (H) 4.1 - 11.1 K/uL    RBC 5.15 4.10 - 5.70 M/uL    HGB 15.7 12.1 - 17.0 g/dL    HCT 48.7 36.6 - 50.3 %    MCV 94.6 80.0 - 99.0 FL    MCH 30.5 26.0 - 34.0 PG    MCHC 32.2 30.0 - 36.5 g/dL    RDW 15.1 (H) 11.5 - 14.5 %    PLATELET 287 455 - 522 K/uL    MPV 10.7 8.9 - 12.9 FL    NRBC 0.0 0.0  WBC    ABSOLUTE NRBC 0.00 0.00 - 0.01 K/uL    NEUTROPHILS 71 32 - 75 %    LYMPHOCYTES 20 12 - 49 %    MONOCYTES 8 5 - 13 %    EOSINOPHILS 0 0 - 7 %    BASOPHILS 0 0 - 1 %    IMMATURE GRANULOCYTES 1 (H) 0 - 0.5 %    ABS. NEUTROPHILS 9.7 (H) 1.8 - 8.0 K/UL    ABS. LYMPHOCYTES 2.8 0.8 - 3.5 K/UL    ABS. MONOCYTES 1.1 (H) 0.0 - 1.0 K/UL    ABS. EOSINOPHILS 0.0 0.0 - 0.4 K/UL    ABS. BASOPHILS 0.0 0.0 - 0.1 K/UL    ABS. IMM. GRANS. 0.1 (H) 0.00 - 0.04 K/UL    DF AUTOMATED     GLUCOSE, POC    Collection Time: 06/16/21 11:18 AM   Result Value Ref Range    Glucose (POC) 293 (H) 65 - 117 mg/dL    Performed by Diana Roy      [unfilled]      Review of Systems    Constitutional: Negative for chills and fever. HENT: Negative. Eyes: Negative. Respiratory: SOB. Cardiovascular: Negative. Gastrointestinal: Negative for abdominal pain and nausea. Skin: Negative. Neurological: Negative. Physical Exam:      Constitutional: pt is oriented to person, place, and time. HENT:   Head: Normocephalic and atraumatic. Eyes: Pupils are equal, round, and reactive to light. EOM are normal.   Cardiovascular: Normal rate, regular rhythm and normal heart sounds. Pulmonary/Chest: Breath sounds normal. No wheezes. No rales. Exhibits no tenderness. Abdominal: Soft. Bowel sounds are normal. There is no abdominal tenderness. There is no rebound and no guarding. Musculoskeletal: Normal range of motion. Neurological: pt is alert and oriented to person, place, and time. CTA CHEST W OR W WO CONT   Final Result   1. Study degraded by suboptimal bolus timing and respiratory motion artifact.    Allowing for these limitations, no large central pulmonary edema was not. 2. Small pericardial effusion. 3. Nonspecific lymphadenopathy in the chest. Consider follow-up imaging to   evaluate for resolution. 4. There is mosaic attenuation in the lungs. There is basilar bronchial   thickening. Findings are favored to be related to small airways disease. 5. Findings of old granulomatous disease. CT ABD PELV W CONT   Final Result   1. Mild bladder wall thickening with pericystic fat stranding. Recommend   correlation with urinalysis for any evidence of cystitis. 2. Small pericardial effusion. 3. Hepatic steatosis. 4. Nonobstructing right renal stones. No ureteral or bladder stone. No   hydronephrosis. There are areas of cortical scarring in the right kidney as has   been seen on prior study. 5. Infrarenal abdominal aortic aneurysm, measuring 2.4 cm in AP dimension. 6. Normal appendix. 7. Please see above report for further details. XR CHEST PORT    (Results Pending)        Recent Results (from the past 24 hour(s))   EKG, 12 LEAD, INITIAL    Collection Time: 06/15/21  3:40 PM   Result Value Ref Range    Ventricular Rate 99 BPM    Atrial Rate 99 BPM    P-R Interval 136 ms    QRS Duration 112 ms    Q-T Interval 370 ms    QTC Calculation (Bezet) 474 ms    Calculated P Axis 57 degrees    Calculated R Axis 14 degrees    Calculated T Axis 50 degrees    Diagnosis       Normal sinus rhythm  RSR' or QR pattern in V1 suggests right ventricular conduction delay  Nonspecific ST and T wave abnormality  Prolonged QT  Abnormal ECG  When compared with ECG of 24-SEP-2012 13:02,  ST now depressed in Anterior leads  Confirmed by Blanca Kay ([id number]) on 6/16/2021 10:22:04 AM     CBC WITH AUTOMATED DIFF    Collection Time: 06/15/21  4:15 PM   Result Value Ref Range    WBC 14.0 (H) 4.1 - 11.1 K/uL    RBC 5.05 4. 10 - 5.70 M/uL    HGB 15.6 12.1 - 17.0 g/dL    HCT 46.9 36.6 - 50.3 %    MCV 92.9 80.0 - 99.0 FL    MCH 30.9 26.0 - 34.0 PG    MCHC 33.3 30.0 - 36.5 g/dL    RDW 14.9 (H) 11.5 - 14.5 %    PLATELET 428 233 - 056 K/uL    MPV 11.1 8.9 - 12.9 FL    NRBC 0.0 0.0  WBC    ABSOLUTE NRBC 0.00 0.00 - 0.01 K/uL    NEUTROPHILS 47 32 - 75 %    LYMPHOCYTES 32 12 - 49 %    MONOCYTES 14 (H) 5 - 13 %    EOSINOPHILS 4 0 - 7 %    BASOPHILS 0 0 - 1 %    OTHER CELL 3 %    IMMATURE GRANULOCYTES 0 %    ABS. NEUTROPHILS 6.6 1.8 - 8.0 K/UL    ABS. LYMPHOCYTES 4.5 (H) 0.8 - 3.5 K/UL    ABS. MONOCYTES 2.0 (H) 0.0 - 1.0 K/UL    ABS. EOSINOPHILS 0.6 (H) 0.0 - 0.4 K/UL    ABS. BASOPHILS 0.0 0.0 - 0.1 K/UL    ABS. IMM. GRANS. 0.0 K/UL    DF Manual      RBC COMMENTS Normocytic, Normochromic     METABOLIC PANEL, COMPREHENSIVE    Collection Time: 06/15/21  4:15 PM   Result Value Ref Range    Sodium 135 (L) 136 - 145 mmol/L    Potassium 3.9 3.5 - 5.1 mmol/L    Chloride 102 97 - 108 mmol/L    CO2 25 21 - 32 mmol/L    Anion gap 8 5 - 15 mmol/L    Glucose 131 (H) 65 - 100 mg/dL    BUN 10 6 - 20 mg/dL    Creatinine 0.82 0.70 - 1.30 mg/dL    BUN/Creatinine ratio 12 12 - 20      GFR est AA >60 >60 ml/min/1.73m2    GFR est non-AA >60 >60 ml/min/1.73m2    Calcium 8.9 8.5 - 10.1 mg/dL    Bilirubin, total 0.4 0.2 - 1.0 mg/dL    AST (SGOT) 28 15 - 37 U/L    ALT (SGPT) 58 12 - 78 U/L    Alk.  phosphatase 100 45 - 117 U/L    Protein, total 8.5 (H) 6.4 - 8.2 g/dL    Albumin 3.7 3.5 - 5.0 g/dL    Globulin 4.8 (H) 2.0 - 4.0 g/dL    A-G Ratio 0.8 (L) 1.1 - 2.2     TROPONIN I    Collection Time: 06/15/21  4:15 PM   Result Value Ref Range    Troponin-I, Qt. <0.05 <0.05 ng/mL   LIPASE    Collection Time: 06/15/21  4:15 PM   Result Value Ref Range    Lipase 41 (L) 73 - 393 U/L   URINALYSIS W/ REFLEX CULTURE    Collection Time: 06/15/21  6:33 PM    Specimen: Urine   Result Value Ref Range    Color Yellow/Straw      Appearance Clear Clear      Specific gravity >1.030 (H) 1.003 - 1.030    pH (UA) 5.0 5.0 - 8.0      Protein Negative Negative mg/dL    Glucose Negative Negative mg/dL Ketone Negative Negative mg/dL    Bilirubin Negative Negative      Blood Negative Negative      Urobilinogen 0.1 0.1 - 1.0 EU/dL    Nitrites Negative Negative      Leukocyte Esterase Negative Negative      UA:UC IF INDICATED Culture not indicated by UA result Culture not indicated by UA result      WBC 0-4 0 - 4 /hpf    RBC 0-5 0 - 5 /hpf    Bacteria Negative Negative /hpf   GLUCOSE, POC    Collection Time: 06/15/21  9:36 PM   Result Value Ref Range    Glucose (POC) 122 (H) 65 - 117 mg/dL    Performed by Milton Perez, POC    Collection Time: 06/16/21  7:33 AM   Result Value Ref Range    Glucose (POC) 225 (H) 65 - 117 mg/dL    Performed by [facility]    CBC WITH AUTOMATED DIFF    Collection Time: 06/16/21 10:02 AM   Result Value Ref Range    WBC 13.7 (H) 4.1 - 11.1 K/uL    RBC 5.15 4.10 - 5.70 M/uL    HGB 15.7 12.1 - 17.0 g/dL    HCT 48.7 36.6 - 50.3 %    MCV 94.6 80.0 - 99.0 FL    MCH 30.5 26.0 - 34.0 PG    MCHC 32.2 30.0 - 36.5 g/dL    RDW 15.1 (H) 11.5 - 14.5 %    PLATELET 815 850 - 377 K/uL    MPV 10.7 8.9 - 12.9 FL    NRBC 0.0 0.0  WBC    ABSOLUTE NRBC 0.00 0.00 - 0.01 K/uL    NEUTROPHILS 71 32 - 75 %    LYMPHOCYTES 20 12 - 49 %    MONOCYTES 8 5 - 13 %    EOSINOPHILS 0 0 - 7 %    BASOPHILS 0 0 - 1 %    IMMATURE GRANULOCYTES 1 (H) 0 - 0.5 %    ABS. NEUTROPHILS 9.7 (H) 1.8 - 8.0 K/UL    ABS. LYMPHOCYTES 2.8 0.8 - 3.5 K/UL    ABS. MONOCYTES 1.1 (H) 0.0 - 1.0 K/UL    ABS. EOSINOPHILS 0.0 0.0 - 0.4 K/UL    ABS. BASOPHILS 0.0 0.0 - 0.1 K/UL    ABS. IMM.  GRANS. 0.1 (H) 0.00 - 0.04 K/UL    DF AUTOMATED     GLUCOSE, POC    Collection Time: 06/16/21 11:18 AM   Result Value Ref Range    Glucose (POC) 293 (H) 65 - 117 mg/dL    Performed by [facility]        Results     Procedure Component Value Units Date/Time    CULTURE, URINE [[id number]] Collected: 06/15/21 2030    Order Status: Completed Specimen: Urine Updated: 06/16/21 0813    CULTURE, RESPIRATORY/SPUTUM/BRONCH Rondal Alexandra STAIN [[id number]] Order Status: Sent Specimen: Sputum     CULTURE, BLOOD, LINE [[id number]]     Order Status: Sent Specimen: Blood            Labs:     Recent Labs     06/16/21  1002 06/15/21  1615   WBC 13.7* 14.0*   HGB 15.7 15.6   HCT 48.7 46.9    373     Recent Labs     06/15/21  1615   *   K 3.9      CO2 25   BUN 10   CREA 0.82   *   CA 8.9     Recent Labs     06/15/21  1615   ALT 58      TBILI 0.4   TP 8.5*   ALB 3.7   GLOB 4.8*   LPSE 41*     No results for input(s): INR, PTP, APTT, INREXT in the last 72 hours. No results for input(s): FE, TIBC, PSAT, FERR in the last 72 hours. No results found for: FOL, RBCF   No results for input(s): PH, PCO2, PO2 in the last 72 hours.   Recent Labs     06/15/21  1615   TROIQ <0.05     No results found for: CHOL, CHOLX, CHLST, CHOLV, HDL, HDLP, LDL, LDLC, DLDLP, TGLX, TRIGL, TRIGP, CHHD, CHHDX  Lab Results   Component Value Date/Time    Glucose (POC) 293 (H) 06/16/2021 11:18 AM    Glucose (POC) 225 (H) 06/16/2021 07:33 AM    Glucose (POC) 122 (H) 06/15/2021 09:36 PM     Lab Results   Component Value Date/Time    Color Yellow/Straw 06/15/2021 06:33 PM    Appearance Clear 06/15/2021 06:33 PM    Specific gravity >1.030 (H) 06/15/2021 06:33 PM    pH (UA) 5.0 06/15/2021 06:33 PM    Protein Negative 06/15/2021 06:33 PM    Glucose Negative 06/15/2021 06:33 PM    Ketone Negative 06/15/2021 06:33 PM    Bilirubin Negative 06/15/2021 06:33 PM    Urobilinogen 0.1 06/15/2021 06:33 PM    Nitrites Negative 06/15/2021 06:33 PM    Leukocyte Esterase Negative 06/15/2021 06:33 PM    Bacteria Negative 06/15/2021 06:33 PM    WBC 0-4 06/15/2021 06:33 PM    RBC 0-5 06/15/2021 06:33 PM         Assessment:     Acute exacerbation of COPD  Abdominal pain right flank pain  History of hypertension  Type 2 diabetes  Ongoing smoking  Chronic back pain from spinal cord injury 30 years ago  Leukocytosis  Mild bladder wall thickening with pericystic fat stranding  Small pericardial effusion  Nonobstructing right renal stone no hydronephrosis  Infrarenal abdominal aortic aneurysm 2.4 cm  Small airway disease      Plan:        DuoNeb nebulizer every 6 hours  Symbicort 160/4.52 puff twice daily  DVT prophylaxis with Lovenox  IV Solu-Medrol 40 IV every 6  IV Zosyn after blood cultures sputum cultures  Nicotine patch      Pulmonary consulted     Surgical consulted for abdominal pain. Due to borderline size of aneurysm, surgeon does not believe that this is the source of abdominal pain.          Current Facility-Administered Medications:     nicotine (NICODERM CQ) 7 mg/24 hr patch 1 Patch, 1 Patch, TransDERmal, DAILY, Alex Jimenez MD    insulin lispro (HUMALOG) injection, , SubCUTAneous, AC&HS, Angela Correa MD, 6 Units at 06/16/21 0845    glucose chewable tablet 16 g, 4 Tablet, Oral, PRN, Ab Correa MD    dextrose (D50W) injection syrg 12.5-25 g, 25-50 mL, IntraVENous, PRN, Ab Correa MD    glucagon (GLUCAGEN) injection 1 mg, 1 mg, IntraMUSCular, PRN, Ab Correa MD    acetaminophen (TYLENOL) tablet 650 mg, 650 mg, Oral, Q6H PRN, 650 mg at 06/16/21 4125 **OR** acetaminophen (TYLENOL) suppository 650 mg, 650 mg, Rectal, Q6H PRN, Ab Correa MD    polyethylene glycol (MIRALAX) packet 17 g, 17 g, Oral, DAILY PRN, Ab Correa MD    ondansetron (ZOFRAN ODT) tablet 4 mg, 4 mg, Oral, Q8H PRN **OR** ondansetron (ZOFRAN) injection 4 mg, 4 mg, IntraVENous, Q6H PRN, Angela Correa MD    enoxaparin (LOVENOX) injection 40 mg, 40 mg, SubCUTAneous, DAILY, Angela Correa MD, 40 mg at 06/16/21 0845    albuterol-ipratropium (DUO-NEB) 2.5 MG-0.5 MG/3 ML, 3 mL, Nebulization, Q6H RT, Angela Correa MD, 3 mL at 06/16/21 0753    methylPREDNISolone (PF) (SOLU-MEDROL) injection 40 mg, 40 mg, IntraVENous, Q6H, Angela Correa MD, 40 mg at 06/16/21 0455    budesonide-formoteroL (SYMBICORT) 160-4.5 mcg/actuation HFA inhaler 2 Puff, 2 Puff, Inhalation, BID, Candelario Ospina MD, 2 Puff at 06/16/21 0753    piperacillin-tazobactam (ZOSYN) 3.375 g in 0.9% sodium chloride (MBP/ADV) 100 mL MBP, 3.375 g, IntraVENous, Q8H, Angela Correa MD, Last Rate: 200 mL/hr at 06/16/21 0454, 3.375 g at 06/16/21 0454

## 2021-06-17 VITALS
HEART RATE: 99 BPM | BODY MASS INDEX: 29.62 KG/M2 | OXYGEN SATURATION: 95 % | DIASTOLIC BLOOD PRESSURE: 96 MMHG | RESPIRATION RATE: 12 BRPM | SYSTOLIC BLOOD PRESSURE: 154 MMHG | HEIGHT: 69 IN | TEMPERATURE: 97.7 F | WEIGHT: 200 LBS

## 2021-06-17 LAB
ALBUMIN SERPL-MCNC: 3.7 G/DL (ref 3.5–5)
ALBUMIN/GLOB SERPL: 0.7 {RATIO} (ref 1.1–2.2)
ALP SERPL-CCNC: 113 U/L (ref 45–117)
ALT SERPL-CCNC: 51 U/L (ref 12–78)
ANION GAP SERPL CALC-SCNC: 9 MMOL/L (ref 5–15)
AST SERPL W P-5'-P-CCNC: 19 U/L (ref 15–37)
BACTERIA SPEC CULT: NORMAL
BASOPHILS # BLD: 0 K/UL (ref 0–0.1)
BASOPHILS NFR BLD: 0 % (ref 0–1)
BILIRUB SERPL-MCNC: 0.4 MG/DL (ref 0.2–1)
BUN SERPL-MCNC: 23 MG/DL (ref 6–20)
BUN/CREAT SERPL: 24 (ref 12–20)
CA-I BLD-MCNC: 9.3 MG/DL (ref 8.5–10.1)
CHLORIDE SERPL-SCNC: 102 MMOL/L (ref 97–108)
CO2 SERPL-SCNC: 24 MMOL/L (ref 21–32)
CREAT SERPL-MCNC: 0.96 MG/DL (ref 0.7–1.3)
DIFFERENTIAL METHOD BLD: ABNORMAL
EOSINOPHIL # BLD: 0 K/UL (ref 0–0.4)
EOSINOPHIL NFR BLD: 0 % (ref 0–7)
ERYTHROCYTE [DISTWIDTH] IN BLOOD BY AUTOMATED COUNT: 15.4 % (ref 11.5–14.5)
EST. AVERAGE GLUCOSE BLD GHB EST-MCNC: 137 MG/DL
GLOBULIN SER CALC-MCNC: 5.1 G/DL (ref 2–4)
GLUCOSE BLD STRIP.AUTO-MCNC: 213 MG/DL (ref 65–117)
GLUCOSE BLD STRIP.AUTO-MCNC: 237 MG/DL (ref 65–117)
GLUCOSE SERPL-MCNC: 211 MG/DL (ref 65–100)
HBA1C MFR BLD: 6.4 % (ref 4–5.6)
HCT VFR BLD AUTO: 46.6 % (ref 36.6–50.3)
HGB BLD-MCNC: 15.1 G/DL (ref 12.1–17)
IMM GRANULOCYTES # BLD AUTO: 0.1 K/UL (ref 0–0.04)
IMM GRANULOCYTES NFR BLD AUTO: 1 % (ref 0–0.5)
LYMPHOCYTES # BLD: 2.8 K/UL (ref 0.8–3.5)
LYMPHOCYTES NFR BLD: 14 % (ref 12–49)
MCH RBC QN AUTO: 30.5 PG (ref 26–34)
MCHC RBC AUTO-ENTMCNC: 32.4 G/DL (ref 30–36.5)
MCV RBC AUTO: 94.1 FL (ref 80–99)
MONOCYTES # BLD: 0.8 K/UL (ref 0–1)
MONOCYTES NFR BLD: 4 % (ref 5–13)
NEUTS SEG # BLD: 16.8 K/UL (ref 1.8–8)
NEUTS SEG NFR BLD: 81 % (ref 32–75)
NRBC # BLD: 0 K/UL (ref 0–0.01)
NRBC BLD-RTO: 0 PER 100 WBC
PERFORMED BY, TECHID: ABNORMAL
PERFORMED BY, TECHID: ABNORMAL
PLATELET # BLD AUTO: 422 K/UL (ref 150–400)
PMV BLD AUTO: 10.6 FL (ref 8.9–12.9)
POTASSIUM SERPL-SCNC: 3.9 MMOL/L (ref 3.5–5.1)
PROT SERPL-MCNC: 8.8 G/DL (ref 6.4–8.2)
RBC # BLD AUTO: 4.95 M/UL (ref 4.1–5.7)
SODIUM SERPL-SCNC: 135 MMOL/L (ref 136–145)
SPECIAL REQUESTS,SREQ: NORMAL
WBC # BLD AUTO: 20.5 K/UL (ref 4.1–11.1)

## 2021-06-17 PROCEDURE — 74011250637 HC RX REV CODE- 250/637: Performed by: INTERNAL MEDICINE

## 2021-06-17 PROCEDURE — 94640 AIRWAY INHALATION TREATMENT: CPT

## 2021-06-17 PROCEDURE — 36415 COLL VENOUS BLD VENIPUNCTURE: CPT

## 2021-06-17 PROCEDURE — 97110 THERAPEUTIC EXERCISES: CPT

## 2021-06-17 PROCEDURE — 99232 SBSQ HOSP IP/OBS MODERATE 35: CPT | Performed by: SURGERY

## 2021-06-17 PROCEDURE — 97161 PT EVAL LOW COMPLEX 20 MIN: CPT

## 2021-06-17 PROCEDURE — 97165 OT EVAL LOW COMPLEX 30 MIN: CPT

## 2021-06-17 PROCEDURE — 80053 COMPREHEN METABOLIC PANEL: CPT

## 2021-06-17 PROCEDURE — 85025 COMPLETE CBC W/AUTO DIFF WBC: CPT

## 2021-06-17 PROCEDURE — 74011636637 HC RX REV CODE- 636/637: Performed by: FAMILY MEDICINE

## 2021-06-17 PROCEDURE — 82962 GLUCOSE BLOOD TEST: CPT

## 2021-06-17 PROCEDURE — 74011250636 HC RX REV CODE- 250/636: Performed by: FAMILY MEDICINE

## 2021-06-17 PROCEDURE — 74011000258 HC RX REV CODE- 258: Performed by: FAMILY MEDICINE

## 2021-06-17 PROCEDURE — 74011000250 HC RX REV CODE- 250: Performed by: FAMILY MEDICINE

## 2021-06-17 PROCEDURE — 74011250637 HC RX REV CODE- 250/637: Performed by: FAMILY MEDICINE

## 2021-06-17 PROCEDURE — 97530 THERAPEUTIC ACTIVITIES: CPT

## 2021-06-17 RX ORDER — IPRATROPIUM BROMIDE AND ALBUTEROL SULFATE 2.5; .5 MG/3ML; MG/3ML
3 SOLUTION RESPIRATORY (INHALATION)
Qty: 30 NEBULE | Refills: 0 | Status: SHIPPED | OUTPATIENT
Start: 2021-06-17

## 2021-06-17 RX ORDER — ONDANSETRON 4 MG/1
4 TABLET, ORALLY DISINTEGRATING ORAL
Qty: 30 TABLET | Refills: 0 | Status: SHIPPED | OUTPATIENT
Start: 2021-06-17

## 2021-06-17 RX ORDER — BUDESONIDE AND FORMOTEROL FUMARATE DIHYDRATE 160; 4.5 UG/1; UG/1
2 AEROSOL RESPIRATORY (INHALATION) 2 TIMES DAILY
Qty: 3 INHALER | Refills: 0 | Status: SHIPPED | OUTPATIENT
Start: 2021-06-17

## 2021-06-17 RX ORDER — PREDNISONE 10 MG/1
TABLET ORAL
Qty: 10 TABLET | Refills: 0 | Status: SHIPPED | OUTPATIENT
Start: 2021-06-17

## 2021-06-17 RX ADMIN — ACETAMINOPHEN 650 MG: 325 TABLET ORAL at 07:18

## 2021-06-17 RX ADMIN — METHYLPREDNISOLONE SODIUM SUCCINATE 40 MG: 40 INJECTION, POWDER, FOR SOLUTION INTRAMUSCULAR; INTRAVENOUS at 05:04

## 2021-06-17 RX ADMIN — PIPERACILLIN AND TAZOBACTAM 3.38 G: 3; .375 INJECTION, POWDER, LYOPHILIZED, FOR SOLUTION INTRAVENOUS at 05:04

## 2021-06-17 RX ADMIN — METHYLPREDNISOLONE SODIUM SUCCINATE 40 MG: 40 INJECTION, POWDER, FOR SOLUTION INTRAMUSCULAR; INTRAVENOUS at 00:58

## 2021-06-17 RX ADMIN — PIPERACILLIN AND TAZOBACTAM 3.38 G: 3; .375 INJECTION, POWDER, LYOPHILIZED, FOR SOLUTION INTRAVENOUS at 12:56

## 2021-06-17 RX ADMIN — INSULIN LISPRO 4 UNITS: 100 INJECTION, SOLUTION INTRAVENOUS; SUBCUTANEOUS at 10:12

## 2021-06-17 RX ADMIN — IPRATROPIUM BROMIDE AND ALBUTEROL SULFATE 3 ML: .5; 3 SOLUTION RESPIRATORY (INHALATION) at 01:21

## 2021-06-17 RX ADMIN — METHYLPREDNISOLONE SODIUM SUCCINATE 40 MG: 40 INJECTION, POWDER, FOR SOLUTION INTRAMUSCULAR; INTRAVENOUS at 12:56

## 2021-06-17 RX ADMIN — ENOXAPARIN SODIUM 40 MG: 40 INJECTION SUBCUTANEOUS at 10:12

## 2021-06-17 RX ADMIN — ACETAMINOPHEN 650 MG: 325 TABLET ORAL at 01:01

## 2021-06-17 RX ADMIN — INSULIN LISPRO 4 UNITS: 100 INJECTION, SOLUTION INTRAVENOUS; SUBCUTANEOUS at 12:56

## 2021-06-17 NOTE — DISCHARGE INSTRUCTIONS
Patient Education        Chronic Obstructive Pulmonary Disease (COPD): Care Instructions  Your Care Instructions     Chronic obstructive pulmonary disease (COPD) is a general term for a group of lung diseases, including emphysema and chronic bronchitis. People with COPD have decreased airflow in and out of the lungs, which makes it hard to breathe. The airways also can get clogged with thick mucus. Cigarette smoking is a major cause of COPD. Although there is no cure for COPD, you can slow its progress. Following your treatment plan and taking care of yourself can help you feel better and live longer. Follow-up care is a key part of your treatment and safety. Be sure to make and go to all appointments, and call your doctor if you are having problems. It's also a good idea to know your test results and keep a list of the medicines you take. How can you care for yourself at home? Staying healthy    · Do not smoke. This is the most important step you can take to prevent more damage to your lungs. If you need help quitting, talk to your doctor about stop-smoking programs and medicines. These can increase your chances of quitting for good.     · Avoid colds and flu. Get a pneumococcal vaccine shot. If you have had one before, ask your doctor whether you need a second dose. Get the flu vaccine every fall. If you must be around people with colds or the flu, wash your hands often.     · Avoid secondhand smoke, air pollution, and high altitudes. Also avoid cold, dry air and hot, humid air. Stay at home with your windows closed when air pollution is bad. Medicines and oxygen therapy    · Take your medicines exactly as prescribed. Call your doctor if you think you are having a problem with your medicine. You may be taking medicines such as:  ? Bronchodilators. These help open your airways and make breathing easier. They are either short-acting (work for 6 to 9 hours) or long-acting (work for 24 hours).  You inhale most bronchodilators, so they start to act quickly. Always carry your quick-relief inhaler with you in case you need it while you are away from home. ? Corticosteroids (prednisone, budesonide). These reduce airway inflammation. They come in pill or inhaled form. You must take these medicines every day for them to work well.     · Ask your doctor or pharmacist if a spacer is right for you. A spacer may help you get more inhaled medicine to your lungs. If you use one, ask how to use it properly.     · Do not take any vitamins, over-the-counter medicine, or herbal products without talking to your doctor first.     · If your doctor prescribed antibiotics, take them as directed. Do not stop taking them just because you feel better. You need to take the full course of antibiotics.     · If you use oxygen therapy, use the flow rate your doctor has recommended. Don't change it without talking to your doctor first. Oxygen therapy boosts the amount of oxygen in your blood and helps you breathe easier. Activity    · Get regular exercise. Walking is an easy way to get exercise. Start out slowly, and walk a little more each day.     · Pay attention to your breathing. You are exercising too hard if you can't talk while you exercise.     · Take short rest breaks when doing household chores and other activities.     · Learn breathing methods--such as breathing through pursed lips--to help you become less short of breath.     · If your doctor has not set you up with a pulmonary rehabilitation program, ask if rehab is right for you. Rehab includes exercise programs, education about your disease and how to manage it, help with diet and other changes, and emotional support. Diet    · Eat regular, healthy meals. Use bronchodilators about 1 hour before you eat to make it easier to eat. Eat several small meals instead of three large ones.  Drink beverages at the end of the meal. Avoid foods that are hard to chew.     · Eat foods that contain protein so you don't lose muscle mass.     · Talk with your doctor if you gain too much weight or if you lose weight without trying. Mental health    · Talk to your family, friends, or a therapist about your feelings. Some people feel frightened, angry, hopeless, helpless, and even guilty. Talking openly about bad feelings can help you cope. If these feelings last, talk to your doctor. When should you call for help? Call 911 anytime you think you may need emergency care. For example, call if:    · You have severe trouble breathing. Call your doctor now or seek immediate medical care if:    · You have new or worse trouble breathing.     · You cough up blood.     · You have a fever. Watch closely for changes in your health, and be sure to contact your doctor if:    · You cough more deeply or more often, especially if you notice more mucus or a change in the color of your mucus.     · You have new or worse swelling in your legs or belly.     · You are not getting better as expected. Where can you learn more? Go to http://www.gray.com/  Enter R051 in the search box to learn more about \"Chronic Obstructive Pulmonary Disease (COPD): Care Instructions. \"  Current as of: October 26, 2020               Content Version: 12.8  © 2006-2021 [employer]. Care instructions adapted under license by [employer] (which disclaims liability or warranty for this information). If you have questions about a medical condition or this instruction, always ask your healthcare professional. [place] [address] any warranty or liability for your use of this information.           Discharge Instructions       PATIENT ID: Jun Renee  MRN: [id number]   YOB: 1960    DATE OF ADMISSION: 6/15/2021  3:33 PM    DATE OF DISCHARGE: 6/17/2021    PRIMARY CARE PROVIDER: Unknown, Provider     ATTENDING PHYSICIAN: Carlos Washington MD  DISCHARGING PROVIDER: Marisol Martinez MD    To contact this individual call [phone] and ask the  to page. If unavailable ask to be transferred the Adult Hospitalist Department. DISCHARGE DIAGNOSES copd    CONSULTATIONS: IP CONSULT TO PULMONOLOGY  IP CONSULT TO GENERAL SURGERY    PROCEDURES/SURGERIES: * No surgery found *    PENDING TEST RESULTS:   At the time of discharge the following test results are still pending: n    FOLLOW UP APPOINTMENTS:   Follow-up Information     Follow up With Specialties Details Why Contact Info    Unknown, Provider    Patient not available to ask             ADDITIONAL CARE RECOMMENDATIONS:no smoking    DIET: Cardiac Diet  Oral Nutritional Supplements: {RDSUPPLEMENTLIST:20094} {RDSUPPFREQUENCY:20080}     ACTIVITY: Activity as tolerated    WOUND CARE: n    EQUIPMENT needed: n      DISCHARGE MEDICATIONS:   See Medication Reconciliation Form    · It is important that you take the medication exactly as they are prescribed. · Keep your medication in the bottles provided by the pharmacist and keep a list of the medication names, dosages, and times to be taken in your wallet. · Do not take other medications without consulting your doctor. NOTIFY YOUR PHYSICIAN FOR ANY OF THE FOLLOWING:   Fever over 101 degrees for 24 hours. Chest pain, shortness of breath, fever, chills, nausea, vomiting, diarrhea, change in mentation, falling, weakness, bleeding. Severe pain or pain not relieved by medications. Or, any other signs or symptoms that you may have questions about.       DISPOSITION:    Home With:   OT  PT  HH  RN       SNF/Inpatient Rehab/LTAC    Independent/assisted living    Hospice    Other:         PROBLEM LIST Updated:  Yes y       Signed:   Marisol Martinez MD  6/17/2021  12:18 PM

## 2021-06-17 NOTE — PROGRESS NOTES
PHYSICAL THERAPY EVALUATION  Patient: Nereida Riley (15 y.o. male)  Date: 6/17/2021  Primary Diagnosis: Hypoxia [R09.02]  COPD (chronic obstructive pulmonary disease) ([facility]) [J44.9]        Precautions: falls      ASSESSMENT  This is a 63 y/o male with  past medical history of COPD , prediabetic ,urinary retention ,history of chronic L1 spinal cord injury 30 years ago initially came to the hospital with flank pain while at the same time  while at the same time, he was hypoxic with oxygen saturation 80% on room air , admitted on 6/15/21 for hypoxia and COPD . Pt received sitting at the EOB with OT already in the room , agreeable for PT eval. Pt is A& O x 4 , as per pt report , he lives with children in a split  level home with no steps to enter , 3 interior steps ,HR on L side going up  , IND with ADL's and mod I for functional transfers/mobility with rollator prior to admission . Based on the objective data described below , currently  with intact static sitting balance , IND with all functional transfers , intact static standing balance and is able to ambulate ~200' with rollator independently and no instances of LOB , decreased weight bearing on the R forefoot , mostly on R heel  . Pt is currently at his functional baseline with no acute PT needs at this time . Pt educated about HEP for b/l LE to build up endurance . Recommend d/c to home independently when medically appropriate . Current Level of Function Impacting Discharge (mobility/balance): Pt is IND with functional mobility     Functional Outcome Measure: The patient scored 24 on the AM Pac basic mobility  outcome measure which is indicative of low complexity .       Other factors to consider for discharge: none        PLAN :  Recommendation for discharge: (in order for the patient to meet his/her long term goals)  Home with family care     This discharge recommendation:  Has been made in collaboration with the attending provider and/or case management    IF patient discharges home will need the following DME: none         SUBJECTIVE:   Patient stated Lia Rayo is always someone at home with me     OBJECTIVE DATA SUMMARY:   HISTORY:    Past Medical History:   Diagnosis Date    Neurologic abnormality     spinal cord injury, can still walk   History reviewed. No pertinent surgical history. Personal factors and/or comorbidities impacting plan of care:     Home Situation  Home Environment: Private residence  # Steps to Enter: 0  Rails to Enter: No  Hand Rails : Left  One/Two Story Residence: [facility] level  # of Interior Steps: 3  Interior Rails: Left  Living Alone: No  Support Systems: Child(gustavo) (grandchildren)  Patient Expects to be Discharged to Cor[de-identified]ration  Current DME Used/Available at Home: Cane, straight, Commode, bedside, Grab bars, Raised toilet seat, Shower chair, Walker, rollator    PLOF: Pt IND for ADLS/IADLS, mod I with mobility with rollator  prior to admission. EXAMINATION/PRESENTATION/DECISION MAKING:   Critical Behavior:  Neurologic State: Alert  Orientation Level: Oriented X4        Hearing: Auditory  Auditory Impairment: None  Skin:  Intact where exposed    Range Of Motion:  AROM: Within functional limits  PROM: Within functional limits  Strength:    Strength: Generally decreased, functional (4/5 grossly)  Tone & Sensation:   Tone: Normal  Sensation: Intact  Functional Mobility:  Bed Mobility:  Rolling: Modified independent  Supine to Sit: Modified independent     Scooting: Modified independent  Transfers:  Sit to Stand: Modified independent  Stand to Sit: Modified independent        Bed to Chair: Modified independent  Balance:   Sitting: Intact  Standing: Intact; With support  Ambulation/Gait Training:  Distance (ft): 200 Feet (ft)  Assistive Device: Walker, rollator  Ambulation - Level of Assistance: Independent      Functional Measure:    [address] [facility] [address] [address] AM-PAC 6 Clicks         Basic Mobility Inpatient Short Form  How much difficulty does the patient currently have. .. Unable A Lot A Little None   1. Turning over in bed (including adjusting bedclothes, sheets and blankets)? [] 1   [] 2   [] 3   [x] 4   2. Sitting down on and standing up from a chair with arms ( e.g., wheelchair, bedside commode, etc.)   [] 1   [] 2   [] 3   [x] 4   3. Moving from lying on back to sitting on the side of the bed? [] 1   [] 2   [] 3   [x] 4          How much help from another person does the patient currently need. .. Total A Lot A Little None   4. Moving to and from a bed to a chair (including a wheelchair)? [] 1   [] 2   [] 3   [x] 4   5. Need to walk in hospital room? [] 1   [] 2   [] 3   [x] 4   6. Climbing 3-5 steps with a railing? [] 1   [] 2   [] 3   [x] 4   © , Trustees of [facility] [employer], under license to [employer]. All rights reserved     Score:  Initial:24 Most Recent: X (Date: 21-- )   Interpretation of Tool:  Represents activities that are increasingly more difficult (i.e. Bed mobility, Transfers, Gait). Score 24 23 22-20 19-15 14-10 9-7 6   Modifier CH CI CJ CK CL CM CN          Physical Therapy Evaluation Charge Determination   History Examination Presentation Decision-Making   MEDIUM  Complexity : 1-2 comorbidities / personal factors will impact the outcome/ POC  LOW Complexity : 1-2 Standardized tests and measures addressing body structure, function, activity limitation and / or participation in recreation  LOW Complexity : Stable, uncomplicated  Other Functional Measure [facility] 6 low complexity      Based on the above components, the patient evaluation is determined to be of the following complexity level: LOW     Pain Ratin/10    Activity Tolerance:   Good  Please refer to the flowsheet for vital signs taken during this treatment.     After treatment patient left in no apparent distress:   Supine in bed and Call bell within reach    COMMUNICATION/EDUCATION:   The patients plan of care was discussed with: Occupational therapist.     Fall prevention education was provided and the patient/caregiver indicated understanding. Partial PT/OT sessions occurred together for increased pt's/clinicians' safety .        Thank you for this referral.  Ammy Katz   Time Calculation: 27 mins

## 2021-06-17 NOTE — PROGRESS NOTES
Problem: Falls - Risk of  Goal: *Absence of Falls  Description: Document Tiffanie Almonte Fall Risk and appropriate interventions in the flowsheet.   Outcome: Progressing Towards Goal  Note: Fall Risk Interventions:  Mobility Interventions: Patient to call before getting OOB, PT Consult for mobility concerns         Medication Interventions: Evaluate medications/consider consulting pharmacy, Patient to call before getting OOB                   Problem: Patient Education: Go to Patient Education Activity  Goal: Patient/Family Education  Outcome: Progressing Towards Goal     Problem: Pain  Goal: *Control of Pain  Outcome: Progressing Towards Goal  Goal: *PALLIATIVE CARE:  Alleviation of Pain  Outcome: Progressing Towards Goal     Problem: Patient Education: Go to Patient Education Activity  Goal: Patient/Family Education  Outcome: Progressing Towards Goal No

## 2021-06-17 NOTE — PROGRESS NOTES
OCCUPATIONAL THERAPY EVALUATION/DISCHARGE  Patient: Tigre Jane (18 y.o. male)  Date: 6/17/2021  Primary Diagnosis: Hypoxia [R09.02]  COPD (chronic obstructive pulmonary disease) ([facility]) [J44.9]        Precautions:        ASSESSMENT  Pt received semi supine in bed A&Ox4 and agreeable for OT eval/tx. Per pt report, pt lives alone in tri level home with 1 step no rails to enter and 3 steps left rail to get to kitchen/living area and is MI for self care (has shower chair, BSC, grab bars, raised toilet seat) and functional transfers/mobility (using rollator long distances, cane short distances). Pt currently presents close to baseline for self care and functional transfers/mobility. Pt educated on energy conservation techniques with pt verbalizing understanding. Pt with no acute OT needs at this time thus will D/C pt from skilled OT services after eval. Recommend discharge to home with family care when medically appropriate. Current Level of Function (ADLs/self-care): MI    Other factors to consider for discharge: at baseline functional status     PLAN :  Recommend with staff: allow toilet transfers    Recommendation for discharge: (in order for the patient to meet his/her long term goals)  No skilled occupational therapy/ follow up rehabilitation needs identified at this time. This discharge recommendation:  Has been made in collaboration with the attending provider and/or case management    IF patient discharges home will need the following DME: none       SUBJECTIVE:   Patient stated I have been getting up to the bathroom.     OBJECTIVE DATA SUMMARY:   HISTORY:   Past Medical History:   Diagnosis Date    Neurologic abnormality     spinal cord injury, can still walk   History reviewed. No pertinent surgical history.     Prior Level of Function/Environment/Context: MI for self care and functional transfers/mobility  Expanded or extensive additional review of patient history:   Home Situation  Home Environment: Private residence  # Steps to Enter: 0  Rails to Enter: No  Hand Rails : Left  One/Two Story Residence: [facility] level  # of Interior Steps: 3  Interior Rails: Left  Living Alone: No  Support Systems: Child(gustavo) (grandchildren)  Patient Expects to be Discharged to[de-identified] [employer]  Current DME Used/Available at Home: Cane, straight, Commode, bedside, Grab bars, Raised toilet seat, Shower chair, Walker, rollator      EXAMINATION OF PERFORMANCE DEFICITS:  Cognitive/Behavioral Status:  Neurologic State: Alert  Orientation Level: Oriented X4     Hearing: Auditory  Auditory Impairment: None    Range of Motion:  AROM: Within functional limits  PROM: Within functional limits     Strength:  Strength: Within functional limits     Balance:  Sitting: Intact  Standing: Intact; With support    Functional Mobility and Transfers for ADLs:  Bed Mobility:  Rolling: Modified independent  Supine to Sit: Modified independent  Scooting: Modified independent    Transfers:  Sit to Stand: Modified independent  Stand to Sit: Modified independent  Bed to Chair: Modified independent  Bathroom Mobility: Modified independent  Toilet Transfer : Modified independent  Assistive Device : Gait Belt;Walker, rollator    ADL Assessment:     Oral Facial Hygiene/Grooming: Independent (simulated standing sink)    Lower Body Dressing: Modified independent    Toileting: Independent (simulated)     ADL Intervention and task modifications:     Grooming  Grooming Assistance: Independent  Position Performed: Standing    Lower Body Dressing Assistance  Socks: Modified independent  Position Performed: Seated edge of bed    Toileting  Toileting Assistance: [address] [facility] AM-[facility] \"6 Clicks\"                                                       Daily Activity Inpatient Short Form  How much help from another person does the patient currently need. .. Total; A Lot A Little None   1. Putting on and taking off regular lower body clothing?  []  1 []  2 []  3 [x] 4   2.  Bathing (including washing, rinsing, drying)? []  1 []  2 []  3 [x]  4   3. Toileting, which includes using toilet, bedpan or urinal? [] 1 []  2 []  3 [x]  4   4. Putting on and taking off regular upper body clothing? []  1 []  2 []  3 [x]  4   5. Taking care of personal grooming such as brushing teeth? []  1 []  2 []  3 [x]  4   6. Eating meals? []  1 []  2 []  3 [x]  4   © 2007, [address] [address], under license to [employer]. All rights reserved     Score: 24/24     Interpretation of Tool:  Represents clinically-significant functional categories (i.e. Activities of daily living). Percentage of Impairment CH    0%   CI    1-19% CJ    20-39% CK    40-59% CL    60-79% CM    80-99% CN     100%   AMPA  Score 6-24 24 23 20-22 15-19 10-14 7-9 6          Occupational Therapy Evaluation Charge Determination   History Examination Decision-Making   LOW Complexity : Brief history review  LOW Complexity : 1-3 performance deficits relating to physical, cognitive , or psychosocial skils that result in activity limitations and / or participation restrictions  LOW Complexity : No comorbidities that affect functional and no verbal or physical assistance needed to complete eval tasks       Based on the above components, the patient evaluation is determined to be of the following complexity level: LOW   Pain Rating:  No pain reported    Activity Tolerance: WNL  Please refer to the flowsheet for vital signs taken during this treatment. After treatment patient left in no apparent distress:    Standing with PT present    COMMUNICATION/EDUCATION:   The patients plan of care was discussed with: Physical therapist.   PT/OT sessions occurred together for increased safety of pt and clinician.       Thank you for this referral.  Floridalma Colbert  Time Calculation: 17 mins

## 2021-06-17 NOTE — DISCHARGE SUMMARY
Discharge Summary       PATIENT ID: Tigre Jane  MRN: [id number]   YOB: 1960    DATE OF ADMISSION: 6/15/2021  3:33 PM    DATE OF DISCHARGE:   PRIMARY CARE PROVIDER: Unknown, Provider     ATTENDING PHYSICIAN: Lauryn Ellis  DISCHARGING PROVIDER: Han Correa      CONSULTATIONS: IP CONSULT TO PULMONOLOGY  IP CONSULT TO GENERAL SURGERY    PROCEDURES/SURGERIES: * No surgery found *    ADMITTING DIAGNOSES:    Patient Active Problem List    Diagnosis Date Noted    Hypoxia 06/15/2021    COPD (chronic obstructive pulmonary disease) ([facility] [address] 06/15/2021       DISCHARGE DIAGNOSES / PLAN:    Acute exacerbation of COPD  Abdominal pain right flank pain  History of hypertension  Type 2 diabetes  Ongoing smoking  Chronic back pain from spinal cord injury 30 years ago  Leukocytosis  Mild bladder wall thickening with pericystic fat stranding  Small pericardial effusion  Nonobstructing right renal stone no hydronephrosis  Infrarenal abdominal aortic aneurysm 2.4 cm  Small airway disease     Smoking cessation. F/u in 1 month with Vascular surgery. Will require repeat US in 1 year to f/u AAA size. DISCHARGE MEDICATIONS:  Current Discharge Medication List            NOTIFY YOUR PHYSICIAN FOR ANY OF THE FOLLOWING:   Fever over 101 degrees for 24 hours. Chest pain, shortness of breath, fever, chills, nausea, vomiting, diarrhea, change in mentation, falling, weakness, bleeding. Severe pain or pain not relieved by medications. Or, any other signs or symptoms that you may have questions about. DISPOSITION:  x  Home With:   OT  PT  HH  RN       Long term SNF/Inpatient Rehab    Independent/assisted living    Hospice    Other:       PATIENT CONDITION AT DISCHARGE: Stable      PHYSICAL EXAMINATION AT DISCHARGE:  General:          Alert, cooperative, no distress, appears stated age.      HEENT:           Atraumatic, anicteric sclerae, pink conjunctivae                          No oral ulcers, mucosa moist, throat clear, dentition fair  Neck:               Supple, symmetrical  Lungs:             Clear to auscultation bilaterally. No Wheezing or Rhonchi. No rales. Chest wall:      No tenderness  No Accessory muscle use. Heart:              Regular  rhythm,  No  murmur   No edema  Abdomen:        Soft, non-tender. Not distended. Bowel sounds normal  Extremities:     No cyanosis. No clubbing,                            Skin turgor normal, Capillary refill normal  Skin:                Not pale. Not Jaundiced  No rashes   Psych:             Not anxious or agitated. Neurologic:      Alert, moves all extremities, answers questions appropriately and responds to commands     XR CHEST PORT   Final Result      CTA CHEST W OR W WO CONT   Final Result   1. Study degraded by suboptimal bolus timing and respiratory motion artifact. Allowing for these limitations, no large central pulmonary edema was not. 2. Small pericardial effusion. 3. Nonspecific lymphadenopathy in the chest. Consider follow-up imaging to   evaluate for resolution. 4. There is mosaic attenuation in the lungs. There is basilar bronchial   thickening. Findings are favored to be related to small airways disease. 5. Findings of old granulomatous disease. CT ABD PELV W CONT   Final Result   1. Mild bladder wall thickening with pericystic fat stranding. Recommend   correlation with urinalysis for any evidence of cystitis. 2. Small pericardial effusion. 3. Hepatic steatosis. 4. Nonobstructing right renal stones. No ureteral or bladder stone. No   hydronephrosis. There are areas of cortical scarring in the right kidney as has   been seen on prior study. 5. Infrarenal abdominal aortic aneurysm, measuring 2.4 cm in AP dimension. 6. Normal appendix. 7. Please see above report for further details.            Recent Results (from the past 24 hour(s))   BLOOD GAS, ARTERIAL    Collection Time: 06/16/21 10:45 AM   Result Value Ref Range    pH 7.41 7.35 - 7.45      PCO2 44 35 - 45 mmHg    PO2 60 (L) 75 - 100 mmHg    O2 SAT 93 (L) >95 %    BICARBONATE 26 22 - 26 mmol/L    BASE EXCESS 2.2 (H) 0 - 2 mmol/L    O2 METHOD Nasal Cannula      O2 FLOW RATE 2.0 L/min    Sample source Arterial      SITE Right Brachial      JONO'S TEST PASS     GLUCOSE, POC    Collection Time: 06/16/21 11:18 AM   Result Value Ref Range    Glucose (POC) 293 (H) 65 - 117 mg/dL    Performed by Samy Bird, POC    Collection Time: 06/16/21  4:03 PM   Result Value Ref Range    Glucose (POC) 223 (H) 65 - 117 mg/dL    Performed by Samy Bird, POC    Collection Time: 06/16/21  8:37 PM   Result Value Ref Range    Glucose (POC) 350 (H) 65 - 117 mg/dL    Performed by Caesar Sandhu    GLUCOSE, POC    Collection Time: 06/17/21  7:46 AM   Result Value Ref Range    Glucose (POC) 237 (H) 65 - 117 mg/dL    Performed by Jason Dixon(PCT)    CBC WITH AUTOMATED DIFF    Collection Time: 06/17/21  8:29 AM   Result Value Ref Range    WBC 20.5 (H) 4.1 - 11.1 K/uL    RBC 4.95 4.10 - 5.70 M/uL    HGB 15.1 12.1 - 17.0 g/dL    HCT 46.6 36.6 - 50.3 %    MCV 94.1 80.0 - 99.0 FL    MCH 30.5 26.0 - 34.0 PG    MCHC 32.4 30.0 - 36.5 g/dL    RDW 15.4 (H) 11.5 - 14.5 %    PLATELET 544 (H) 010 - 400 K/uL    MPV 10.6 8.9 - 12.9 FL    NRBC 0.0 0.0  WBC    ABSOLUTE NRBC 0.00 0.00 - 0.01 K/uL    NEUTROPHILS 81 (H) 32 - 75 %    LYMPHOCYTES 14 12 - 49 %    MONOCYTES 4 (L) 5 - 13 %    EOSINOPHILS 0 0 - 7 %    BASOPHILS 0 0 - 1 %    IMMATURE GRANULOCYTES 1 (H) 0 - 0.5 %    ABS. NEUTROPHILS 16.8 (H) 1.8 - 8.0 K/UL    ABS. LYMPHOCYTES 2.8 0.8 - 3.5 K/UL    ABS. MONOCYTES 0.8 0.0 - 1.0 K/UL    ABS. EOSINOPHILS 0.0 0.0 - 0.4 K/UL    ABS. BASOPHILS 0.0 0.0 - 0.1 K/UL    ABS. IMM.  GRANS. 0.1 (H) 0.00 - 0.04 K/UL    DF AUTOMATED     METABOLIC PANEL, COMPREHENSIVE    Collection Time: 06/17/21  8:29 AM   Result Value Ref Range    Sodium 135 (L) 136 - 145 mmol/L    Potassium 3.9 3.5 - 5.1 mmol/L Chloride 102 97 - 108 mmol/L    CO2 24 21 - 32 mmol/L    Anion gap 9 5 - 15 mmol/L    Glucose 211 (H) 65 - 100 mg/dL    BUN 23 (H) 6 - 20 mg/dL    Creatinine 0.96 0.70 - 1.30 mg/dL    BUN/Creatinine ratio 24 (H) 12 - 20      GFR est AA >60 >60 ml/min/1.73m2    GFR est non-AA >60 >60 ml/min/1.73m2    Calcium 9.3 8.5 - 10.1 mg/dL    Bilirubin, total 0.4 0.2 - 1.0 mg/dL    AST (SGOT) 19 15 - 37 U/L    ALT (SGPT) 51 12 - 78 U/L    Alk. phosphatase 113 45 - 117 U/L    Protein, total 8.8 (H) 6.4 - 8.2 g/dL    Albumin 3.7 3.5 - 5.0 g/dL    Globulin 5.1 (H) 2.0 - 4.0 g/dL    A-G Ratio 0.7 (L) 1.1 - 2.2            HOSPITAL COURSE:  Patient is a 62year old male with a medical history of COPD, prediabetes, history of urinary retention, history of spinal cord injury 30 years ago who presented to the ER with worsening flank pain 6/15. The patient normally follows-up with the [place] for this flank pain that has been present for the past 1 year, but over the last week, it has gotten worse. He is having trouble walking due to the pain. Denies fever, chills, cough, congestion. When he presented to the ER, he was hypoxic with a saturation of 80% on room air. CT scan of abdomen and pelvis performed. Patient admitted due to COPD exacerbation. Patient started on Zosyn, DuoNeb, Solu-Medrol, and Symbicort. Pulmonology and vascular surgery consulted. Vascular surgery believes abdominal pain is related to renal stone and AAA. PT and OT consulted. Patient has gotten progressively better since admission. Today he is \"breathing better\" and denies any pain. He is up and walking. Smoking cessation was discussed. Patient would like to stop smoking, but is not fully committed.        Signed:   Mounika Patel  6/17/2021  10:12 AM

## 2021-06-17 NOTE — PROGRESS NOTES
PULMONARY NOTE  [facility]    Name: Alberto Fuller MRN: [id number]   : 1960 Hospital: [address] [place] [address]   Date: 2021  Admission date: 6/15/2021 Hospital Day: 3       HPI:     Hospital Problems  Never Reviewed        Codes Class Noted POA    Hypoxia ICD-10-CM: R09.02  ICD-9-CM: 799.02  6/15/2021 Unknown        [facility] ICD-10-CM: J44.9  ICD-9-CM: 300  6/15/2021 Unknown                   [x] High complexity decision making was performed  [x] See my orders for details      Subjective/Initial History:     I was asked by Emma Oliveira MD to see Alberto Fuller  a 62 y.o.  male in consultation     Excerpts from admission 6/15/2021 or consult notes as follows:   52-year-old male came in because of shortness of breath dyspnea and having abdominal pain. Past medical history of COPD history of urinary retention after spinal cord injury.   Patient usually follows at [facility].  For the past 1 week he has been having more shortness of breath and dyspnea came to the hospital saturation was about 80% on room air he is a smoker half a pack per day used to smoke 1 pack/day for about 30+ years now is on oxygen 2 L nasal cannula so pulmonary consult was called      No Known Allergies     MAR reviewed and pertinent medications noted or modified as needed     Current Facility-Administered Medications   Medication    nicotine (NICODERM CQ) 7 mg/24 hr patch 1 Patch    insulin lispro (HUMALOG) injection    glucose chewable tablet 16 g    dextrose (D50W) injection syrg 12.5-25 g    glucagon (GLUCAGEN) injection 1 mg    acetaminophen (TYLENOL) tablet 650 mg    Or    acetaminophen (TYLENOL) suppository 650 mg    polyethylene glycol (MIRALAX) packet 17 g    ondansetron (ZOFRAN ODT) tablet 4 mg    Or    ondansetron (ZOFRAN) injection 4 mg    enoxaparin (LOVENOX) injection 40 mg    albuterol-ipratropium (DUO-NEB) 2.5 MG-0.5 MG/3 ML    methylPREDNISolone (PF) (SOLU-MEDROL) injection 40 mg    budesonide-formoteroL (SYMBICORT) 160-4.5 mcg/actuation HFA inhaler 2 Puff    piperacillin-tazobactam (ZOSYN) 3.375 g in 0.9% sodium chloride (MBP/ADV) 100 mL MBP      Patient PCP: Unknown, Provider  PMH:  has a past medical history of Neurologic abnormality. PSH:   has no past surgical history on file. FHX: family history is not on file. SHX:  reports that he has been smoking. He does not have any smokeless tobacco history on file. He reports that he does not use drugs. ROS:    Review of Systems   Constitutional: Positive for malaise/fatigue. HENT: Negative. Eyes: Negative. Respiratory: Positive for cough, shortness of breath and wheezing. Cardiovascular: Negative. Gastrointestinal: Positive for abdominal pain. Genitourinary: Negative. Musculoskeletal: Negative. Skin: Negative. Neurological: Negative. Endo/Heme/Allergies: Negative. Psychiatric/Behavioral: Negative. Objective:     Vital Signs: Telemetry:    normal sinus rhythm Intake/Output:   Visit Vitals  BP (!) 154/96 (BP 1 Location: Left upper arm, BP Patient Position: At rest)   Pulse 99   Temp 97.7 °F (36.5 °C)   Resp 12   Ht 5' 9\" (1.753 m)   Wt 90.7 kg (200 lb)   SpO2 95%   BMI 29.53 kg/m²       Temp (24hrs), Av.9 °F (36.6 °C), Min:97.7 °F (36.5 °C), Max:98.4 °F (36.9 °C)        O2 Device: None (Room air) O2 Flow Rate (L/min): 2 l/min       Wt Readings from Last 4 Encounters:   06/15/21 90.7 kg (200 lb)   01/10/21 93 kg (205 lb)        No intake or output data in the 24 hours ending 21 0912    Last shift:      No intake/output data recorded. Last 3 shifts: No intake/output data recorded. Physical Exam:     Physical Exam  Constitutional:       Appearance: Normal appearance. HENT:      Head: Normocephalic and atraumatic.       Nose: Nose normal.      Mouth/Throat:      Mouth: Mucous membranes are moist.   Eyes:      Pupils: Pupils are equal, round, and reactive to light. Cardiovascular:      Rate and Rhythm: Normal rate and regular rhythm. Pulses: Normal pulses. Pulmonary:      Effort: Respiratory distress present. Breath sounds: Wheezing present. Abdominal:      General: Abdomen is flat. Tenderness: There is abdominal tenderness. Musculoskeletal:         General: Normal range of motion. Cervical back: Normal range of motion. Skin:     General: Skin is warm. Neurological:      Mental Status: He is alert. Labs:    Recent Labs     06/17/21  0829 06/16/21  1002 06/15/21  1615   WBC 20.5* 13.7* 14.0*   HGB 15.1 15.7 15.6   * 379 373     Recent Labs     06/16/21  1002 06/15/21  1615   * 135*   K 3.8 3.9   CL 98 102   CO2 22 25   * 131*   BUN 16 10   CREA 1.23 0.82   CA 9.4 8.9   ALB 3.7 3.7   ALT 57 58   LPSE  --  41*     Recent Labs     06/16/21  1045   PH 7.41   PCO2 44   PO2 60*   HCO3 26     Recent Labs     06/15/21  1615   TROIQ <0.05     No results found for: BNPP, BNP   Lab Results   Component Value Date/Time    Culture result: No Growth (<1000 cfu/mL) 06/15/2021 08:30 PM   No results found for: TSH, TSHEXT, TSHEXT    Imaging:    CXR Results  (Last 48 hours)               06/16/21 1408  XR CHEST PORT Final result    Narrative:  Chest single view. Comparison single view chest September 24, 2012. RLL calcified granuloma. Similar mild coarse reticular markings through lungs. No interstitial or alveolar pulmonary edema. Cardiac and mediastinal structures unchanged. No pneumothorax or pleural effusion             Results from Hospital Encounter encounter on 06/15/21    CT ABD PELV W CONT    Narrative  Examination: CT abdomen and pelvis with contrast.    HISTORY: Right flank pain.     Technique: Transaxial computed tomographic images of the abdomen and pelvis were  obtained following the uneventful administration of 100 mL Isovue 370  intravenous contrast. Coronal and sagittal reconstructions were created. Dose Reduction: All CT scans at this facility are performed using dose reduction  optimization techniques as appropriate to a performed exam including the  following: Automated exposure control, adjustments of the mA and/or kV according  to patient size, or use of iterative reconstruction technique. COMPARISON: 8/25/2009    FINDINGS: Calcified nodule in the right lung base is compatible with old  granulomatous disease. There is mild basilar atelectasis. There is a small  pericardial effusion. Hypoattenuation of the liver is compatible with hepatic steatosis. No focal  hepatic lesion. There is no biliary duct dilation. The gallbladder, pancreas and  adrenal glands are normal. There is a small amount of splenic tissue in the left  upper quadrant, but the dominant spleen is absent. There are multiple sub-5 mm  nonobstructing stones in the right kidney. There are areas of cortical scarring  in the right kidney. Left kidney enhances normally. There are no ureteral or  bladder stones. There is no hydronephrosis. The stomach and small and large bowel are normal in course and caliber without  evidence of obstruction or inflammation. The appendix is normal.    There is mild bladder wall thickening with pericystic fat stranding. There is a  small bladder dome diverticulum. The prostate is mildly enlarged. The abdominal  aorta is atherosclerotic and tortuous. There is an infrarenal abdominal aortic  aneurysm, which measures 3.4 cm in AP dimension. There is a mildly enlarged  nonspecific left inguinal lymph node measuring 11 mm in short axis. Additional  scattered subcentimeter lymph nodes are present. There is no free fluid or free  intraperitoneal gas. There is multilevel degenerative disc disease there is unchanged L1 hemangioma. There are changes of diffuse idiopathic skeletal hyperostosis. There is fusion  of facet joints at multiple levels.  There is right hip osteoarthritis with a  posterior joint body. Impression  1. Mild bladder wall thickening with pericystic fat stranding. Recommend  correlation with urinalysis for any evidence of cystitis. 2. Small pericardial effusion. 3. Hepatic steatosis. 4. Nonobstructing right renal stones. No ureteral or bladder stone. No  hydronephrosis. There are areas of cortical scarring in the right kidney as has  been seen on prior study. 5. Infrarenal abdominal aortic aneurysm, measuring 2.4 cm in AP dimension. 6. Normal appendix. 7. Please see above report for further details. IMPRESSION:   1. Acute hypoxic respiratory failure  2. Chronic Obstructive Pulmonary Disease   3. Body mass index is 29.53 kg/m². 4. Chronic back pain  5. Abdominal aortic aneurysm infrarenal  6. Hypertension and type 2 diabetes mellitus  7. Prognosis guarded       RECOMMENDATIONS/PLAN:     1. On nasal Cannula oxygen will start weaning oxygen per protocol  2. Patient on IV Solu-Medrol nebulizer treatment and inhaled steroid Symbicort  3. Nicotine patch  4. Abdominal pain most likely related to renal stone  5. Smoking cessation counseling done  6. CXR RLL calcified granuloma. Similar mild coarse reticular markings through lungs.   7. Pulse ox room air and ambulation             Lennox Erwin MD

## 2021-06-17 NOTE — DISCHARGE SUMMARY
Discharge Summary       PATIENT ID: Eric King  MRN: [id number]   YOB: 1960    DATE OF ADMISSION: 6/15/2021  3:33 PM    DATE OF DISCHARGE:   PRIMARY CARE PROVIDER: Unknown, Provider     ATTENDING PHYSICIAN: Lisha Alberto  DISCHARGING PROVIDER: Carla Correa      CONSULTATIONS: IP CONSULT TO PULMONOLOGY  IP CONSULT TO GENERAL SURGERY    PROCEDURES/SURGERIES: * No surgery found *    ADMITTING DIAGNOSES:    Patient Active Problem List    Diagnosis Date Noted    Hypoxia 06/15/2021    COPD (chronic obstructive pulmonary disease) ([facility] [address] 06/15/2021       DISCHARGE DIAGNOSES / PLAN:    Acute exacerbation of COPD  Abdominal pain right flank pain  History of hypertension  Type 2 diabetes  Ongoing smoking  Chronic back pain from spinal cord injury 30 years ago  Leukocytosis  Mild bladder wall thickening with pericystic fat stranding  Small pericardial effusion  Nonobstructing right renal stone no hydronephrosis  Infrarenal abdominal aortic aneurysm 2.4 cm  Small airway disease     Smoking cessation. F/u in 1 month with Vascular surgery. Will require repeat US in 1 year to f/u AAA size. DISCHARGE MEDICATIONS:  Current Discharge Medication List      START taking these medications    Details   albuterol-ipratropium (DUO-NEB) 2.5 mg-0.5 mg/3 ml nebu 3 mL by Nebulization route every six (6) hours as needed for Wheezing. Qty: 30 Nebule, Refills: 0  Start date: 6/17/2021      budesonide-formoteroL (SYMBICORT) 160-4.5 mcg/actuation HFAA Take 2 Puffs by inhalation two (2) times a day. Qty: 3 Inhaler, Refills: 0  Start date: 6/17/2021      predniSONE (DELTASONE) 10 mg tablet 1 tablet daily  Qty: 10 Tablet, Refills: 0  Start date: 6/17/2021      ondansetron (ZOFRAN ODT) 4 mg disintegrating tablet Take 1 Tablet by mouth every eight (8) hours as needed for Nausea or Vomiting.   Qty: 30 Tablet, Refills: 0  Start date: 6/17/2021         CONTINUE these medications which have NOT CHANGED    Details metFORMIN (GLUCOPHAGE) 500 mg tablet Take 500 mg by mouth two (2) times daily (with meals). hydrOXYzine HCL (ATARAX) 25 mg tablet Take 25 mg by mouth three (3) times daily as needed. DULoxetine (CYMBALTA) 60 mg capsule Take 60 mg by mouth daily. atorvastatin (LIPITOR) 80 mg tablet Take 80 mg by mouth daily. nicotine (NICODERM CQ) 14 mg/24 hr patch 1 Patch by TransDERmal route every twenty-four (24) hours. STOP taking these medications       cyclobenzaprine (FLEXERIL) 10 mg tablet Comments:   Reason for Stopping:         cetirizine (ZYRTEC) 10 mg tablet Comments:   Reason for Stopping:         gabapentin (NEURONTIN) 600 mg tablet Comments:   Reason for Stopping:         melatonin 5 mg cap capsule Comments:   Reason for Stopping:         celecoxib (CeleBREX) 200 mg capsule Comments:   Reason for Stopping:         Venlafaxine-ER 24 HR (EFFEXOR-ER) 150 mg tr24 tablet Comments:   Reason for Stopping:         sildenafil citrate (VIAGRA) 100 mg tablet Comments:   Reason for Stopping:         oxybutynin (DITROPAN) 5 mg tablet Comments:   Reason for Stopping:         capsicum oleoresin 0.025 % topical cream Comments:   Reason for Stopping:                 NOTIFY YOUR PHYSICIAN FOR ANY OF THE FOLLOWING:   Fever over 101 degrees for 24 hours. Chest pain, shortness of breath, fever, chills, nausea, vomiting, diarrhea, change in mentation, falling, weakness, bleeding. Severe pain or pain not relieved by medications. Or, any other signs or symptoms that you may have questions about. DISPOSITION:  x  Home With:   OT  PT  HH  RN       Long term SNF/Inpatient Rehab    Independent/assisted living    Hospice    Other:       PATIENT CONDITION AT DISCHARGE: Stable      PHYSICAL EXAMINATION AT DISCHARGE:  General:          Alert, cooperative, no distress, appears stated age.      HEENT:           Atraumatic, anicteric sclerae, pink conjunctivae                          No oral ulcers, mucosa moist, throat clear, dentition fair  Neck:               Supple, symmetrical  Lungs:             Clear to auscultation bilaterally. No Wheezing or Rhonchi. No rales. Chest wall:      No tenderness  No Accessory muscle use. Heart:              Regular  rhythm,  No  murmur   No edema  Abdomen:        Soft, non-tender. Not distended. Bowel sounds normal  Extremities:     No cyanosis. No clubbing,                            Skin turgor normal, Capillary refill normal  Skin:                Not pale. Not Jaundiced  No rashes   Psych:             Not anxious or agitated. Neurologic:      Alert, moves all extremities, answers questions appropriately and responds to commands     XR CHEST PORT   Final Result      CTA CHEST W OR W WO CONT   Final Result   1. Study degraded by suboptimal bolus timing and respiratory motion artifact. Allowing for these limitations, no large central pulmonary edema was not. 2. Small pericardial effusion. 3. Nonspecific lymphadenopathy in the chest. Consider follow-up imaging to   evaluate for resolution. 4. There is mosaic attenuation in the lungs. There is basilar bronchial   thickening. Findings are favored to be related to small airways disease. 5. Findings of old granulomatous disease. CT ABD PELV W CONT   Final Result   1. Mild bladder wall thickening with pericystic fat stranding. Recommend   correlation with urinalysis for any evidence of cystitis. 2. Small pericardial effusion. 3. Hepatic steatosis. 4. Nonobstructing right renal stones. No ureteral or bladder stone. No   hydronephrosis. There are areas of cortical scarring in the right kidney as has   been seen on prior study. 5. Infrarenal abdominal aortic aneurysm, measuring 2.4 cm in AP dimension. 6. Normal appendix. 7. Please see above report for further details.            Recent Results (from the past 24 hour(s))   GLUCOSE, POC    Collection Time: 06/16/21  4:03 PM   Result Value Ref Range    Glucose (POC) 223 (H) 65 - 117 mg/dL    Performed by Daniel Montgomery, POC    Collection Time: 06/16/21  8:37 PM   Result Value Ref Range    Glucose (POC) 350 (H) 65 - 117 mg/dL    Performed by Cleveland Lowe    GLUCOSE, POC    Collection Time: 06/17/21  7:46 AM   Result Value Ref Range    Glucose (POC) 237 (H) 65 - 117 mg/dL    Performed by Gilma Dixon(PCT)    CBC WITH AUTOMATED DIFF    Collection Time: 06/17/21  8:29 AM   Result Value Ref Range    WBC 20.5 (H) 4.1 - 11.1 K/uL    RBC 4.95 4.10 - 5.70 M/uL    HGB 15.1 12.1 - 17.0 g/dL    HCT 46.6 36.6 - 50.3 %    MCV 94.1 80.0 - 99.0 FL    MCH 30.5 26.0 - 34.0 PG    MCHC 32.4 30.0 - 36.5 g/dL    RDW 15.4 (H) 11.5 - 14.5 %    PLATELET 408 (H) 559 - 400 K/uL    MPV 10.6 8.9 - 12.9 FL    NRBC 0.0 0.0  WBC    ABSOLUTE NRBC 0.00 0.00 - 0.01 K/uL    NEUTROPHILS 81 (H) 32 - 75 %    LYMPHOCYTES 14 12 - 49 %    MONOCYTES 4 (L) 5 - 13 %    EOSINOPHILS 0 0 - 7 %    BASOPHILS 0 0 - 1 %    IMMATURE GRANULOCYTES 1 (H) 0 - 0.5 %    ABS. NEUTROPHILS 16.8 (H) 1.8 - 8.0 K/UL    ABS. LYMPHOCYTES 2.8 0.8 - 3.5 K/UL    ABS. MONOCYTES 0.8 0.0 - 1.0 K/UL    ABS. EOSINOPHILS 0.0 0.0 - 0.4 K/UL    ABS. BASOPHILS 0.0 0.0 - 0.1 K/UL    ABS. IMM. GRANS. 0.1 (H) 0.00 - 0.04 K/UL    DF AUTOMATED     METABOLIC PANEL, COMPREHENSIVE    Collection Time: 06/17/21  8:29 AM   Result Value Ref Range    Sodium 135 (L) 136 - 145 mmol/L    Potassium 3.9 3.5 - 5.1 mmol/L    Chloride 102 97 - 108 mmol/L    CO2 24 21 - 32 mmol/L    Anion gap 9 5 - 15 mmol/L    Glucose 211 (H) 65 - 100 mg/dL    BUN 23 (H) 6 - 20 mg/dL    Creatinine 0.96 0.70 - 1.30 mg/dL    BUN/Creatinine ratio 24 (H) 12 - 20      GFR est AA >60 >60 ml/min/1.73m2    GFR est non-AA >60 >60 ml/min/1.73m2    Calcium 9.3 8.5 - 10.1 mg/dL    Bilirubin, total 0.4 0.2 - 1.0 mg/dL    AST (SGOT) 19 15 - 37 U/L    ALT (SGPT) 51 12 - 78 U/L    Alk.  phosphatase 113 45 - 117 U/L    Protein, total 8.8 (H) 6.4 - 8.2 g/dL    Albumin 3.7 3.5 - 5.0 g/dL Globulin 5.1 (H) 2.0 - 4.0 g/dL    A-G Ratio 0.7 (L) 1.1 - 2.2     GLUCOSE, POC    Collection Time: 06/17/21 11:14 AM   Result Value Ref Range    Glucose (POC) 213 (H) 65 - 117 mg/dL    Performed by Mathew Lombard [facility]           [facility] COURSE:  Patient is a 62year old male with a medical history of COPD, prediabetes, history of urinary retention, history of spinal cord injury 30 years ago who presented to the ER with worsening flank pain 6/15. The patient normally follows-up with the [facility] for this flank pain that has been present for the past 1 year, but over the last week, it has gotten worse. He is having trouble walking due to the pain. Denies fever, chills, cough, congestion. When he presented to the ER, he was hypoxic with a saturation of 80% on room air. CT scan of abdomen and pelvis performed. Patient admitted due to COPD exacerbation. Patient started on Zosyn, DuoNeb, Solu-Medrol, and Symbicort. Pulmonology and vascular surgery consulted. Vascular surgery believes abdominal pain is related to renal stone and AAA. PT and OT consulted. Patient has gotten progressively better since admission. Today he is \"breathing better\" and denies any pain. He is up and walking. Smoking cessation was discussed. Patient would like to stop smoking, but is not fully committed.      Patient was walking on room air saturations above 90%  Patient was seen by the vascular surgeon regarding aneurysm he recommended follow-up to his office in few weeks    Spo2 on room air at rest 95%  Spo2 on room air during ambulation 96%    Discussed with the patient regarding smoking    Medication reconciliation done time discharge patient 35 minutes 50% time spent counseling and coordination of care      Signed:   Wilfredo Latham MD  6/17/2021  10:12 AM

## 2021-06-17 NOTE — PROGRESS NOTES
PROGRESS NOTE      Chief Complaints:  Pain is better. HPI and  Objective:    Patient is examined this morning. Patient states pain is completely gone now. He is off oxygen now. Patient denies chest pain shortness of breath or fever or nausea. Review of Systems:  Rest of review systems negative. EXAM:  Visit Vitals  /81   Pulse 91   Temp 98 °F (36.7 °C)   Resp 18   Ht 5' 9\" (1.753 m)   Wt 200 lb (90.7 kg)   SpO2 94%   BMI 29.53 kg/m²     Patient is awake and alert  Head and neck atraumatic normocephalic  Cardiovascular regular rate  Pulmonary no audible wheeze  Abdomen soft nontender distended or palpable mass. Neurologically intact  Skin is warm and moist.  Extremities shows a well perfused distally. Recent Results (from the past 24 hour(s))   METABOLIC PANEL, COMPREHENSIVE    Collection Time: 06/16/21 10:02 AM   Result Value Ref Range    Sodium 133 (L) 136 - 145 mmol/L    Potassium 3.8 3.5 - 5.1 mmol/L    Chloride 98 97 - 108 mmol/L    CO2 22 21 - 32 mmol/L    Anion gap 13 5 - 15 mmol/L    Glucose 294 (H) 65 - 100 mg/dL    BUN 16 6 - 20 mg/dL    Creatinine 1.23 0.70 - 1.30 mg/dL    BUN/Creatinine ratio 13 12 - 20      GFR est AA >60 >60 ml/min/1.73m2    GFR est non-AA >60 >60 ml/min/1.73m2    Calcium 9.4 8.5 - 10.1 mg/dL    Bilirubin, total 0.4 0.2 - 1.0 mg/dL    AST (SGOT) 25 15 - 37 U/L    ALT (SGPT) 57 12 - 78 U/L    Alk.  phosphatase 94 45 - 117 U/L    Protein, total 8.8 (H) 6.4 - 8.2 g/dL    Albumin 3.7 3.5 - 5.0 g/dL    Globulin 5.1 (H) 2.0 - 4.0 g/dL    A-G Ratio 0.7 (L) 1.1 - 2.2     CBC WITH AUTOMATED DIFF    Collection Time: 06/16/21 10:02 AM   Result Value Ref Range    WBC 13.7 (H) 4.1 - 11.1 K/uL    RBC 5.15 4.10 - 5.70 M/uL    HGB 15.7 12.1 - 17.0 g/dL    HCT 48.7 36.6 - 50.3 %    MCV 94.6 80.0 - 99.0 FL    MCH 30.5 26.0 - 34.0 PG    MCHC 32.2 30.0 - 36.5 g/dL    RDW 15.1 (H) 11.5 - 14.5 %    PLATELET 133 947 - 724 K/uL    MPV 10.7 8.9 - 12.9 FL    NRBC 0.0 0.0  WBC    ABSOLUTE NRBC 0. 00 0.00 - 0.01 K/uL    NEUTROPHILS 71 32 - 75 %    LYMPHOCYTES 20 12 - 49 %    MONOCYTES 8 5 - 13 %    EOSINOPHILS 0 0 - 7 %    BASOPHILS 0 0 - 1 %    IMMATURE GRANULOCYTES 1 (H) 0 - 0.5 %    ABS. NEUTROPHILS 9.7 (H) 1.8 - 8.0 K/UL    ABS. LYMPHOCYTES 2.8 0.8 - 3.5 K/UL    ABS. MONOCYTES 1.1 (H) 0.0 - 1.0 K/UL    ABS. EOSINOPHILS 0.0 0.0 - 0.4 K/UL    ABS. BASOPHILS 0.0 0.0 - 0.1 K/UL    ABS. IMM. GRANS. 0.1 (H) 0.00 - 0.04 K/UL    DF AUTOMATED     BLOOD GAS, ARTERIAL    Collection Time: 06/16/21 10:45 AM   Result Value Ref Range    pH 7.41 7.35 - 7.45      PCO2 44 35 - 45 mmHg    PO2 60 (L) 75 - 100 mmHg    O2 SAT 93 (L) >95 %    BICARBONATE 26 22 - 26 mmol/L    BASE EXCESS 2.2 (H) 0 - 2 mmol/L    O2 METHOD Nasal Cannula      O2 FLOW RATE 2.0 L/min    Sample source Arterial      SITE Right Brachial      JONO'S TEST PASS     GLUCOSE, POC    Collection Time: 06/16/21 11:18 AM   Result Value Ref Range    Glucose (POC) 293 (H) 65 - 117 mg/dL    Performed by Nikhil Stiles, POC    Collection Time: 06/16/21  4:03 PM   Result Value Ref Range    Glucose (POC) 223 (H) 65 - 117 mg/dL    Performed by Nikhil Stiles, POC    Collection Time: 06/16/21  8:37 PM   Result Value Ref Range    Glucose (POC) 350 (H) 65 - 117 mg/dL    Performed by Oliver Hubbard        ASSESSMENT:   Patient is 62 y.o. with diagnosis of : Active Problems:    Hypoxia (6/15/2021)      COPD (chronic obstructive pulmonary disease) ([address] (6/15/2021)    Abdominal pain. PLAN:                     Patient's abdominal symptoms resolved. And patient feels better. He is off oxygen. Most likely patient can be discharged home today. Please have patient come see me for follow-up about a month and subsequently patient will require repeat ultrasound about a year follow-up relatively small size AAA.